# Patient Record
Sex: FEMALE | Race: WHITE | NOT HISPANIC OR LATINO | Employment: FULL TIME | URBAN - METROPOLITAN AREA
[De-identification: names, ages, dates, MRNs, and addresses within clinical notes are randomized per-mention and may not be internally consistent; named-entity substitution may affect disease eponyms.]

---

## 2023-08-23 DIAGNOSIS — F41.9 ANXIETY DISORDER, UNSPECIFIED: ICD-10-CM

## 2023-08-23 DIAGNOSIS — F32.A DEPRESSION, UNSPECIFIED: ICD-10-CM

## 2023-08-23 RX ORDER — SERTRALINE HYDROCHLORIDE 50 MG/1
50 TABLET, FILM COATED ORAL DAILY
Qty: 30 TABLET | Refills: 0 | Status: SHIPPED | OUTPATIENT
Start: 2023-08-23 | End: 2023-09-25

## 2023-08-30 DIAGNOSIS — F32.A DEPRESSION, UNSPECIFIED: ICD-10-CM

## 2023-08-30 DIAGNOSIS — F41.9 ANXIETY DISORDER, UNSPECIFIED: ICD-10-CM

## 2023-08-30 NOTE — TELEPHONE ENCOUNTER
Pt requests Zoloft 50 mg to be sent to [facility]. Pt is out of medication. Thank you.

## 2023-08-31 PROBLEM — G47.30 SLEEP DISORDER BREATHING: Status: ACTIVE | Noted: 2023-08-31

## 2023-08-31 PROBLEM — Z97.3 WEARS EYEGLASSES: Status: ACTIVE | Noted: 2023-08-31

## 2023-08-31 PROBLEM — H53.009 AMBLYOPIA: Status: ACTIVE | Noted: 2023-08-31

## 2023-08-31 PROBLEM — H18.529 ABMD (ANTERIOR BASEMENT MEMBRANE DYSTROPHY): Status: ACTIVE | Noted: 2023-08-31

## 2023-08-31 PROBLEM — H16.9 KERATITIS: Status: ACTIVE | Noted: 2023-08-31

## 2023-08-31 PROBLEM — H52.203 ASTIGMATISM OF BOTH EYES: Status: ACTIVE | Noted: 2023-08-31

## 2023-08-31 PROBLEM — R00.2 HEART PALPITATIONS: Status: ACTIVE | Noted: 2023-08-31

## 2023-08-31 PROBLEM — M94.0 COSTOCHONDRITIS: Status: ACTIVE | Noted: 2023-08-31

## 2023-08-31 PROBLEM — R06.83 SNORING: Status: ACTIVE | Noted: 2023-08-31

## 2023-08-31 PROBLEM — R41.3 MEMORY DIFFICULTIES: Status: ACTIVE | Noted: 2023-08-31

## 2023-08-31 PROBLEM — G47.33 OSA (OBSTRUCTIVE SLEEP APNEA): Status: ACTIVE | Noted: 2023-08-31

## 2023-08-31 PROBLEM — D22.5 MELANOCYTIC NEVI OF TRUNK: Status: ACTIVE | Noted: 2019-07-01

## 2023-08-31 PROBLEM — F41.9 ANXIETY AND DEPRESSION: Status: ACTIVE | Noted: 2023-08-31

## 2023-08-31 PROBLEM — H52.13 MYOPIA OF BOTH EYES: Status: ACTIVE | Noted: 2023-08-31

## 2023-08-31 PROBLEM — L68.0 HIRSUTISM: Status: ACTIVE | Noted: 2019-07-01

## 2023-08-31 PROBLEM — H18.839: Status: ACTIVE | Noted: 2023-08-31

## 2023-08-31 PROBLEM — F32.A ANXIETY AND DEPRESSION: Status: ACTIVE | Noted: 2023-08-31

## 2023-08-31 PROBLEM — H40.009 GLAUCOMA SUSPECT: Status: ACTIVE | Noted: 2023-08-31

## 2023-08-31 RX ORDER — LEVONORGESTREL 52 MG/1
INTRAUTERINE DEVICE INTRAUTERINE
COMMUNITY
Start: 2021-10-18

## 2023-08-31 RX ORDER — SERTRALINE HYDROCHLORIDE 50 MG/1
50 TABLET, FILM COATED ORAL DAILY
Qty: 30 TABLET | Refills: 0 | OUTPATIENT
Start: 2023-08-31

## 2023-08-31 RX ORDER — FLUTICASONE PROPIONATE 50 MCG
2 SPRAY, SUSPENSION (ML) NASAL DAILY
COMMUNITY
Start: 2023-08-09

## 2023-09-11 ENCOUNTER — APPOINTMENT (OUTPATIENT)
Dept: PRIMARY CARE | Facility: CLINIC | Age: 48
End: 2023-09-11
Payer: COMMERCIAL

## 2023-09-21 ENCOUNTER — APPOINTMENT (OUTPATIENT)
Dept: PRIMARY CARE | Facility: CLINIC | Age: 48
End: 2023-09-21
Payer: COMMERCIAL

## 2023-09-22 ENCOUNTER — TELEPHONE (OUTPATIENT)
Dept: PRIMARY CARE | Facility: CLINIC | Age: 48
End: 2023-09-22
Payer: COMMERCIAL

## 2023-09-22 DIAGNOSIS — B37.9 YEAST INFECTION: Primary | ICD-10-CM

## 2023-09-23 DIAGNOSIS — F32.A DEPRESSION, UNSPECIFIED: ICD-10-CM

## 2023-09-23 DIAGNOSIS — F41.9 ANXIETY DISORDER, UNSPECIFIED: ICD-10-CM

## 2023-09-25 DIAGNOSIS — F41.9 ANXIETY DISORDER, UNSPECIFIED: ICD-10-CM

## 2023-09-25 DIAGNOSIS — F32.A DEPRESSION, UNSPECIFIED: ICD-10-CM

## 2023-09-25 RX ORDER — SERTRALINE HYDROCHLORIDE 50 MG/1
50 TABLET, FILM COATED ORAL DAILY
Qty: 30 TABLET | Refills: 0 | Status: SHIPPED | OUTPATIENT
Start: 2023-09-25 | End: 2023-09-25

## 2023-09-25 RX ORDER — NYSTATIN 100000 U/G
CREAM TOPICAL
COMMUNITY
End: 2023-09-25 | Stop reason: SDUPTHER

## 2023-09-25 RX ORDER — SERTRALINE HYDROCHLORIDE 50 MG/1
TABLET, FILM COATED ORAL
Qty: 90 TABLET | Refills: 0 | Status: SHIPPED | OUTPATIENT
Start: 2023-09-25 | End: 2023-11-15 | Stop reason: SDUPTHER

## 2023-09-25 RX ORDER — NYSTATIN 100000 U/G
CREAM TOPICAL 2 TIMES DAILY
Qty: 30 G | Refills: 0 | Status: SHIPPED | OUTPATIENT
Start: 2023-09-25 | End: 2024-03-07

## 2023-09-25 NOTE — TELEPHONE ENCOUNTER
Patient last seen 7/2022. Appt 11/2023. Looks like zoloft was already sent in. Looks like the last time the nystatin cream was sent in in the old system was 10/2021. Send in Rx?

## 2023-10-04 ENCOUNTER — APPOINTMENT (OUTPATIENT)
Dept: PRIMARY CARE | Facility: CLINIC | Age: 48
End: 2023-10-04
Payer: COMMERCIAL

## 2023-10-09 ENCOUNTER — APPOINTMENT (OUTPATIENT)
Dept: OPHTHALMOLOGY | Facility: CLINIC | Age: 48
End: 2023-10-09
Payer: COMMERCIAL

## 2023-11-13 ENCOUNTER — APPOINTMENT (OUTPATIENT)
Dept: DERMATOLOGY | Facility: HOSPITAL | Age: 48
End: 2023-11-13
Payer: COMMERCIAL

## 2023-11-15 ENCOUNTER — OFFICE VISIT (OUTPATIENT)
Dept: PRIMARY CARE | Facility: CLINIC | Age: 48
End: 2023-11-15
Payer: COMMERCIAL

## 2023-11-15 ENCOUNTER — LAB (OUTPATIENT)
Dept: LAB | Facility: LAB | Age: 48
End: 2023-11-15
Payer: COMMERCIAL

## 2023-11-15 VITALS
HEART RATE: 60 BPM | RESPIRATION RATE: 16 BRPM | DIASTOLIC BLOOD PRESSURE: 80 MMHG | OXYGEN SATURATION: 98 % | WEIGHT: 173 LBS | TEMPERATURE: 97.8 F | BODY MASS INDEX: 30.65 KG/M2 | HEIGHT: 63 IN | SYSTOLIC BLOOD PRESSURE: 122 MMHG

## 2023-11-15 DIAGNOSIS — F41.9 ANXIETY DISORDER, UNSPECIFIED: ICD-10-CM

## 2023-11-15 DIAGNOSIS — Z12.31 ENCOUNTER FOR SCREENING MAMMOGRAM FOR BREAST CANCER: ICD-10-CM

## 2023-11-15 DIAGNOSIS — Z00.00 HEALTHCARE MAINTENANCE: Primary | ICD-10-CM

## 2023-11-15 DIAGNOSIS — Z12.4 SCREENING FOR CERVICAL CANCER: ICD-10-CM

## 2023-11-15 DIAGNOSIS — F32.A DEPRESSION, UNSPECIFIED: ICD-10-CM

## 2023-11-15 DIAGNOSIS — Z00.00 HEALTHCARE MAINTENANCE: ICD-10-CM

## 2023-11-15 LAB
ALBUMIN SERPL BCP-MCNC: 4.5 G/DL (ref 3.4–5)
ALP SERPL-CCNC: 72 U/L (ref 33–110)
ALT SERPL W P-5'-P-CCNC: 14 U/L (ref 7–45)
ANION GAP SERPL CALC-SCNC: 12 MMOL/L (ref 10–20)
AST SERPL W P-5'-P-CCNC: 15 U/L (ref 9–39)
BILIRUB SERPL-MCNC: 0.5 MG/DL (ref 0–1.2)
BUN SERPL-MCNC: 12 MG/DL (ref 6–23)
CALCIUM SERPL-MCNC: 9.6 MG/DL (ref 8.6–10.3)
CHLORIDE SERPL-SCNC: 103 MMOL/L (ref 98–107)
CHOLEST SERPL-MCNC: 227 MG/DL (ref 0–199)
CHOLESTEROL/HDL RATIO: 5
CO2 SERPL-SCNC: 28 MMOL/L (ref 21–32)
CREAT SERPL-MCNC: 0.67 MG/DL (ref 0.5–1.05)
ERYTHROCYTE [DISTWIDTH] IN BLOOD BY AUTOMATED COUNT: 12.2 % (ref 11.5–14.5)
GFR SERPL CREATININE-BSD FRML MDRD: >90 ML/MIN/1.73M*2
GLUCOSE SERPL-MCNC: 86 MG/DL (ref 74–99)
HCT VFR BLD AUTO: 40.8 % (ref 36–46)
HDLC SERPL-MCNC: 45.3 MG/DL
HGB BLD-MCNC: 14 G/DL (ref 12–16)
LDLC SERPL CALC-MCNC: 127 MG/DL
MCH RBC QN AUTO: 31.2 PG (ref 26–34)
MCHC RBC AUTO-ENTMCNC: 34.3 G/DL (ref 32–36)
MCV RBC AUTO: 91 FL (ref 80–100)
NON HDL CHOLESTEROL: 182 MG/DL (ref 0–149)
NRBC BLD-RTO: 0 /100 WBCS (ref 0–0)
PLATELET # BLD AUTO: 225 X10*3/UL (ref 150–450)
POC APPEARANCE, URINE: CLEAR
POC BILIRUBIN, URINE: NEGATIVE
POC BLOOD, URINE: NEGATIVE
POC COLOR, URINE: YELLOW
POC GLUCOSE, URINE: NEGATIVE MG/DL
POC KETONES, URINE: NEGATIVE MG/DL
POC LEUKOCYTES, URINE: NEGATIVE
POC NITRITE,URINE: NEGATIVE
POC PH, URINE: 6 PH
POC PROTEIN, URINE: NEGATIVE MG/DL
POC SPECIFIC GRAVITY, URINE: 1.01
POC UROBILINOGEN, URINE: 0.2 EU/DL
POTASSIUM SERPL-SCNC: 4 MMOL/L (ref 3.5–5.3)
PROT SERPL-MCNC: 7.5 G/DL (ref 6.4–8.2)
RBC # BLD AUTO: 4.49 X10*6/UL (ref 4–5.2)
SODIUM SERPL-SCNC: 139 MMOL/L (ref 136–145)
TRIGL SERPL-MCNC: 276 MG/DL (ref 0–149)
VLDL: 55 MG/DL (ref 0–40)
WBC # BLD AUTO: 5.7 X10*3/UL (ref 4.4–11.3)

## 2023-11-15 PROCEDURE — 81002 URINALYSIS NONAUTO W/O SCOPE: CPT | Performed by: FAMILY MEDICINE

## 2023-11-15 PROCEDURE — 80053 COMPREHEN METABOLIC PANEL: CPT

## 2023-11-15 PROCEDURE — 1036F TOBACCO NON-USER: CPT | Performed by: FAMILY MEDICINE

## 2023-11-15 PROCEDURE — 85027 COMPLETE CBC AUTOMATED: CPT

## 2023-11-15 PROCEDURE — 80061 LIPID PANEL: CPT

## 2023-11-15 PROCEDURE — 36415 COLL VENOUS BLD VENIPUNCTURE: CPT

## 2023-11-15 PROCEDURE — 87624 HPV HI-RISK TYP POOLED RSLT: CPT

## 2023-11-15 PROCEDURE — 88175 CYTOPATH C/V AUTO FLUID REDO: CPT

## 2023-11-15 PROCEDURE — 99396 PREV VISIT EST AGE 40-64: CPT | Performed by: FAMILY MEDICINE

## 2023-11-15 PROCEDURE — 93000 ELECTROCARDIOGRAM COMPLETE: CPT | Performed by: FAMILY MEDICINE

## 2023-11-15 RX ORDER — SERTRALINE HYDROCHLORIDE 50 MG/1
75 TABLET, FILM COATED ORAL DAILY
Qty: 135 TABLET | Refills: 3 | Status: SHIPPED | OUTPATIENT
Start: 2023-11-15 | End: 2024-11-14

## 2023-11-15 ASSESSMENT — ENCOUNTER SYMPTOMS
SHORTNESS OF BREATH: 0
DIARRHEA: 0
DYSURIA: 0
WHEEZING: 0
SINUS PRESSURE: 0
PALPITATIONS: 0
EYE ITCHING: 0
HEADACHES: 0
COUGH: 0
TROUBLE SWALLOWING: 0
EYE REDNESS: 0
NAUSEA: 0
POLYDIPSIA: 0
DIZZINESS: 0
FREQUENCY: 0
VOMITING: 0
FEVER: 0
NUMBNESS: 0
SORE THROAT: 0
UNEXPECTED WEIGHT CHANGE: 0
DIFFICULTY URINATING: 0
SINUS PAIN: 0
FATIGUE: 0
MYALGIAS: 0
EYE PAIN: 0

## 2023-11-15 ASSESSMENT — PATIENT HEALTH QUESTIONNAIRE - PHQ9
SUM OF ALL RESPONSES TO PHQ9 QUESTIONS 1 AND 2: 0
1. LITTLE INTEREST OR PLEASURE IN DOING THINGS: NOT AT ALL
2. FEELING DOWN, DEPRESSED OR HOPELESS: NOT AT ALL

## 2023-11-15 ASSESSMENT — COLUMBIA-SUICIDE SEVERITY RATING SCALE - C-SSRS
6. HAVE YOU EVER DONE ANYTHING, STARTED TO DO ANYTHING, OR PREPARED TO DO ANYTHING TO END YOUR LIFE?: NO
1. IN THE PAST MONTH, HAVE YOU WISHED YOU WERE DEAD OR WISHED YOU COULD GO TO SLEEP AND NOT WAKE UP?: NO
2. HAVE YOU ACTUALLY HAD ANY THOUGHTS OF KILLING YOURSELF?: NO

## 2023-11-15 NOTE — PROGRESS NOTES
"Subjective   Patient ID: Ivette Molina is a 48 y.o. female who presents for Annual Exam.    HPI     Fasting: overnight  Flu shot: taken    Mammogram: due   Pap: due     Anxiety: feels anxiety is higher, some life events have being affecting her. She noticing more difficulty with concentrating at work. Might want another referral to psychiatry.     Bursitis: in right shoulder, improved with therapy, but feels it is worsening now     Lower left back: lump, might be fatty or cystic         Review of Systems   Constitutional:  Negative for fatigue, fever and unexpected weight change.   HENT:  Negative for congestion, hearing loss, nosebleeds, sinus pressure, sinus pain, sore throat and trouble swallowing.    Eyes:  Negative for pain, redness and itching.   Respiratory:  Negative for cough, shortness of breath and wheezing.    Cardiovascular:  Negative for chest pain and palpitations.   Gastrointestinal:  Negative for diarrhea, nausea and vomiting.   Endocrine: Negative for cold intolerance, heat intolerance, polydipsia and polyuria.   Genitourinary:  Negative for difficulty urinating, dysuria and frequency.   Musculoskeletal:  Negative for myalgias.   Neurological:  Negative for dizziness, numbness and headaches.   Psychiatric/Behavioral:  Negative for self-injury and suicidal ideas.        Objective   /80 (BP Location: Left arm, Patient Position: Sitting)   Pulse 60   Temp 36.6 °C (97.8 °F)   Resp 16   Ht 1.6 m (5' 3\")   Wt 78.5 kg (173 lb)   SpO2 98%   BMI 30.65 kg/m²     Physical Exam  HENT:      Head: Normocephalic and atraumatic.      Right Ear: Tympanic membrane, ear canal and external ear normal.      Left Ear: Tympanic membrane, ear canal and external ear normal.      Nose: Nose normal.      Mouth/Throat:      Pharynx: Oropharynx is clear.   Eyes:      Extraocular Movements: Extraocular movements intact.      Conjunctiva/sclera: Conjunctivae normal.      Pupils: Pupils are equal, round, and " reactive to light.   Cardiovascular:      Rate and Rhythm: Normal rate and regular rhythm.   Pulmonary:      Breath sounds: Normal breath sounds.   Abdominal:      General: Bowel sounds are normal.      Palpations: Abdomen is soft.   Genitourinary:     General: Normal vulva.      Vagina: Normal.      Cervix: Normal.      Uterus: Normal.       Adnexa: Right adnexa normal and left adnexa normal.   Musculoskeletal:         General: Tenderness present.   Skin:     General: Skin is warm and dry.   Neurological:      Mental Status: She is alert.   Psychiatric:         Mood and Affect: Mood normal.         Behavior: Behavior normal.         Assessment/Plan   Problem List Items Addressed This Visit    None  Visit Diagnoses         Codes    Healthcare maintenance    -  Primary Z00.00    Relevant Orders    ECG 12 lead (Clinic Performed) (Completed)    POCT UA (nonautomated) manually resulted (Completed)    CBC    Comprehensive Metabolic Panel    Lipid Panel    Encounter for screening mammogram for breast cancer     Z12.31    Relevant Orders    BI mammo bilateral screening tomosynthesis    Anxiety disorder, unspecified     F41.9    Relevant Medications    sertraline (Zoloft) 50 mg tablet    Other Relevant Orders    Referral to Psychiatry    Depression, unspecified     F32.A    Relevant Medications    sertraline (Zoloft) 50 mg tablet    Screening for cervical cancer     Z12.4    Relevant Orders    THINPREP PAP TEST

## 2023-12-08 LAB
CYTOLOGY CMNT CVX/VAG CYTO-IMP: NORMAL
HPV HR 12 DNA GENITAL QL NAA+PROBE: NEGATIVE
HPV HR GENOTYPES PNL CVX NAA+PROBE: NEGATIVE
HPV16 DNA SPEC QL NAA+PROBE: NEGATIVE
HPV18 DNA SPEC QL NAA+PROBE: NEGATIVE
LAB AP HPV GENOTYPE QUESTION: YES
LAB AP HPV HR: NORMAL
LABORATORY COMMENT REPORT: NORMAL
PATH REPORT.TOTAL CANCER: NORMAL

## 2024-01-12 ENCOUNTER — HOSPITAL ENCOUNTER (OUTPATIENT)
Dept: RADIOLOGY | Facility: HOSPITAL | Age: 49
Discharge: HOME | End: 2024-01-12
Payer: COMMERCIAL

## 2024-01-12 DIAGNOSIS — Z12.31 ENCOUNTER FOR SCREENING MAMMOGRAM FOR BREAST CANCER: ICD-10-CM

## 2024-01-12 PROCEDURE — 77063 BREAST TOMOSYNTHESIS BI: CPT | Performed by: RADIOLOGY

## 2024-01-12 PROCEDURE — 77067 SCR MAMMO BI INCL CAD: CPT | Performed by: RADIOLOGY

## 2024-01-12 PROCEDURE — 77067 SCR MAMMO BI INCL CAD: CPT

## 2024-03-06 DIAGNOSIS — B37.9 YEAST INFECTION: ICD-10-CM

## 2024-03-07 RX ORDER — NYSTATIN 100000 U/G
CREAM TOPICAL
Qty: 30 G | Refills: 0 | Status: SHIPPED | OUTPATIENT
Start: 2024-03-07

## 2024-03-12 ENCOUNTER — EVALUATION (OUTPATIENT)
Dept: PHYSICAL THERAPY | Facility: CLINIC | Age: 49
End: 2024-03-12
Payer: COMMERCIAL

## 2024-03-12 DIAGNOSIS — M25.511 CHRONIC RIGHT SHOULDER PAIN: Primary | ICD-10-CM

## 2024-03-12 DIAGNOSIS — M75.51 BURSITIS OF RIGHT SHOULDER: ICD-10-CM

## 2024-03-12 DIAGNOSIS — G89.29 CHRONIC RIGHT SHOULDER PAIN: Primary | ICD-10-CM

## 2024-03-12 DIAGNOSIS — M25.611 DECREASED RANGE OF MOTION OF RIGHT SHOULDER: ICD-10-CM

## 2024-03-12 PROCEDURE — 97161 PT EVAL LOW COMPLEX 20 MIN: CPT | Mod: GP

## 2024-03-12 PROCEDURE — 97110 THERAPEUTIC EXERCISES: CPT | Mod: GP

## 2024-03-12 NOTE — PROGRESS NOTES
Patient Name: Ivette Molina  MRN: [id number]  Today's Date: 3/12/2024  Visit #1       Subjective:  Chief Complaint: Ivette is a 49 y.o. F who presents to therapy c/o R shoulder pain that travels down her lateral arm to her forearm. Symptoms began 1 year ago after lifting luggage OH - which started out as anterior shoulder pain. She followed this up with 6-7 visits of physical therapy until symptoms improved. This time - her symptoms appear to be more dispersed and limiting. She experiences pain at night and has noticed progressive mobility deficits. Denies any N/T, unexplained weight loss, HA, cervical pain, vertigo, nausea, nystagmus. No history of diabetes or thyroid disorders. Family history of adhesive capsulitis (mother).  Pain intensity at rest: 3/10  Pain intensity at worst: 6/10  Alleviating factors: Avoiding painful movements.   Aggravating factors: Sleeping/night, reaching OH and out to side  PMH: R SA impingement   Occupation: Clinical Researcher. Enjoys participating in group exercise classes with her friends.   Therapy Goals: Reduce pain and increase mobility     Objective:  Cervical AROM: No reproduction w/ OP  Flexion: No loss  Extension: No loss  Rotation L/R: No loss/No loss  Side Bending L/R: No loss/No loss     Shoulder PROM (L/R)  Flexion: 170°/110°  Abduction: 170°/95°  Extension: 60°/30°  External Rotation: T2 / Occiput  Internal rotation: T10 / Iliac crest    Shoulder PROM (L/R)  Flexion: 170°/110° - Shoulder hiking  Abduction: 170°/95° - shoulder hiking  Extension: 60°/30°  External Rotation: 90°/5°  Internal rotation: 70°/°10    Shoulder MMT (L/R)  Upper trapezius: 5/5, 5/5  Supraspinatus: 5/5, 4/5  Infraspinatus: 5/5, 4/5  Teres Minor: 5/5, 4/5 (adjusted position)  Subscapularis: 5/5, 4/5    Joint mobility testing (normal unless otherwise noted below)  GHJ: Moderate-severe hypomobility in all directions on R    Shoulder Special Tests (L/R)    Painful Arc: Unable to test on  R  Infraspinatus MMT: Negative/Negative  Drop Arm Test: Negative/Negative  Lateral Betzy: Negative/Positive  Shrug Sign: Negative/Positive   Lift-Off: Unable to test  Sher-Fredrick: Negative/ Positive  Passive Compression Test: Negative/Negative  Passive Distraction Test: Negative/Negative  Capsular pattern: Negative/Positive    QuickDASH: 27.27%    Treatment:  Therapeutic Exercise: HEP provided  *Pendulum swings -> weighted  *Table slides: Flex and ABD variations  *Sleeper stretch at wall  *Supine ER stretch in supine w/ wand    Education: SH anatomy, POC, intensity of exercises/avoiding pushing through pain, progression of exercises, daily exercise participation. Answered all questions in full.    Assessment:   Ivette presents to therapy w/ c/o R global shoulder pain that occasionally refers to her elbow and forearm. Symptoms do not appear to be radicular in nature - nor do they represent any SAS pathology. At this point - symptoms appear to be consistent w/ possible adhesive capsulitis. She is likely to benefit from skilled interventions to address their impairments, and treatment that includes: manual techniques to decrease pain and improve joint/soft-tissue mobility, as well as exercises to increase strength, endurance, and neuromotor control.    Standardized testing and measures administered today reveal that the patient has multiple impairments in body structures and functions, activity limitations, and participation restrictions.  The patient has 0 personal factors and comorbidities that may serve as barriers affecting the plan of care. The patient's clinical presentation includes stable & uncomplicated characteristics as noted during today's evaluation, & these findings indicate that this patient is of low complexity.  Skilled PT services are warranted in order to realize measurable change in the above outcome measures and achieve improvements in the patient's functional status and individual  goals.    Plan:   Planned interventions include: dry needling, education/instruction, manual therapy, neuromuscular re-education, self care/home management, therapeutic activities and therapeutic exercises.   Potential to achieve rehab goals: good  Goals:   Demonstrate independence with home exercise program  Tolerate increased exercise without adverse reaction  Demonstrate improved posture & proper body mechanics throughout session  Increase R SH ROM to pain free + WNL  Increase R SH strength to pain free + WNL  Report decrease in pain by > 2 points to meet the MCID  Decrease score of Quick DASH by > 8 points to meet the MCID  Perform ADLs without an increase symptoms  Pt. will be able to sleep through the night without an increase in symptoms  Achieve pt. specific goals including: Return to previous level of activity - uninhibited by pain

## 2024-03-18 ENCOUNTER — OFFICE VISIT (OUTPATIENT)
Dept: DERMATOLOGY | Facility: HOSPITAL | Age: 49
End: 2024-03-18
Payer: COMMERCIAL

## 2024-03-18 DIAGNOSIS — L81.4 LENTIGO: ICD-10-CM

## 2024-03-18 DIAGNOSIS — D22.9 MULTIPLE BENIGN NEVI: Primary | ICD-10-CM

## 2024-03-18 DIAGNOSIS — L82.1 SEBORRHEIC KERATOSIS: ICD-10-CM

## 2024-03-18 DIAGNOSIS — D18.01 HEMANGIOMA OF SKIN: ICD-10-CM

## 2024-03-18 DIAGNOSIS — Z12.83 SCREENING EXAM FOR SKIN CANCER: ICD-10-CM

## 2024-03-18 DIAGNOSIS — L57.8 ACTINIC SKIN DAMAGE: ICD-10-CM

## 2024-03-18 PROCEDURE — 1036F TOBACCO NON-USER: CPT | Performed by: DERMATOLOGY

## 2024-03-18 PROCEDURE — 99203 OFFICE O/P NEW LOW 30 MIN: CPT | Performed by: DERMATOLOGY

## 2024-03-18 PROCEDURE — 99213 OFFICE O/P EST LOW 20 MIN: CPT | Mod: GC | Performed by: DERMATOLOGY

## 2024-03-18 ASSESSMENT — DERMATOLOGY PATIENT ASSESSMENT
DO YOU HAVE IRREGULAR MENSTRUAL CYCLES: NO
WHAT TYPE OF BIRTH CONTROL: IUD
DO YOU USE SUNSCREEN: DAILY
HAVE YOU HAD OR DO YOU HAVE VASCULAR DISEASE: NO
DO YOU HAVE ANY NEW OR CHANGING LESIONS: NO
ARE YOU AN ORGAN TRANSPLANT RECIPIENT: NO
HAVE YOU HAD OR DO YOU HAVE A STAPH INFECTION: NO
ARE YOU TRYING TO GET PREGNANT: NO
ARE YOU ON BIRTH CONTROL: YES

## 2024-03-18 ASSESSMENT — DERMATOLOGY QUALITY OF LIFE (QOL) ASSESSMENT
RATE HOW BOTHERED YOU ARE BY SYMPTOMS OF YOUR SKIN PROBLEM (EG, ITCHING, STINGING BURNING, HURTING OR SKIN IRRITATION): 0 - NEVER BOTHERED
DATE THE QUALITY-OF-LIFE ASSESSMENT WAS COMPLETED: 66917
ARE THERE EXCLUSIONS OR EXCEPTIONS FOR THE QUALITY OF LIFE ASSESSMENT: NO
RATE HOW BOTHERED YOU ARE BY EFFECTS OF YOUR SKIN PROBLEMS ON YOUR ACTIVITIES (EG, GOING OUT, ACCOMPLISHING WHAT YOU WANT, WORK ACTIVITIES OR YOUR RELATIONSHIPS WITH OTHERS): 0 - NEVER BOTHERED
RATE HOW EMOTIONALLY BOTHERED YOU ARE BY YOUR SKIN PROBLEM (FOR EXAMPLE, WORRY, EMBARRASSMENT, FRUSTRATION): 0 - NEVER BOTHERED

## 2024-03-18 ASSESSMENT — PATIENT GLOBAL ASSESSMENT (PGA): PATIENT GLOBAL ASSESSMENT: PATIENT GLOBAL ASSESSMENT:  1 - CLEAR

## 2024-03-18 ASSESSMENT — ITCH NUMERIC RATING SCALE: HOW SEVERE IS YOUR ITCHING?: 0

## 2024-03-18 NOTE — PROGRESS NOTES
Subjective     Ivette Molina is a 49 y.o. female who presents for the following: Skin Check. No lesions of concern today. Denies family history of melanoma, but sister had an SCC removed.    Review of Systems:  No other skin or systemic complaints other than what is documented elsewhere in the note.    The following portions of the chart were reviewed this encounter and updated as appropriate:         Skin Cancer History  No skin cancer on file.      Specialty Problems          Dermatology Problems    Hirsutism    Melanocytic nevi of trunk        Objective   Well appearing patient in no apparent distress; mood and affect are within normal limits.    A full examination was performed including scalp, head, eyes, ears, nose, lips, neck, chest, axillae, abdomen, back, buttocks, bilateral upper extremities, bilateral lower extremities, hands, feet, fingers, toes, fingernails, and toenails. All findings within normal limits unless otherwise noted below.    Assessment/Plan   1. Multiple benign nevi  Brown and tan macules and papules with reassuring findings on dermoscopy    -These lesions have benign, reassuring patterns on dermoscopy  -Recommend continued self observation, and to contact the office if any changes in nevi are noticed    2. Lentigo  Tan macules    -Benign appearing on exam  -Reassurance, recommend observation    3. Seborrheic keratosis  Stuck on, waxy macule(s)/papule(s)/plaque(s) with comedo-like openings and milia like cysts    -Discussed the nature of the diagnosis  -Reassurance, recommend continued observation    4. Hemangioma of skin  Cherry red papules    -Discussed the nature of the diagnosis  -Reassurance, recommend continued observation    5. Actinic skin damage  Background of photodamage with hyper- and hypo-pigmented macules on the skin    6. Screening exam for skin cancer    Full body skin exam  -No lesions concerning for malignancy on the remainder the skin exam today   - The ugly duckling  sign was discussed. Monitor for any skin lesions that are different in color, shape, or size than others on body  -Sun protection was discussed. Recommend SPF 30+, hats with brims, sun protective clothing, and avoiding sun exposure between 10 AM and 2 PM whenever possible  -Recommend regular skin exams or sooner if new or changing lesions       Abdoul Gooden MD  PGY-4 Dermatology    RTC in 2 years for Full Skin Exam     I saw and evaluated the patient. I personally obtained the key and critical portions of the history and physical exam or was physically present for key and critical portions performed by the resident/fellow. I reviewed the resident/fellow's documentation and discussed the patient with the resident/fellow. I agree with the resident/fellow's medical decision making as documented in the note and made changes where appropriate.     Karly Nieves MD

## 2024-03-18 NOTE — PATIENT INSTRUCTIONS

## 2024-03-19 ENCOUNTER — APPOINTMENT (OUTPATIENT)
Dept: PHYSICAL THERAPY | Facility: CLINIC | Age: 49
End: 2024-03-19
Payer: COMMERCIAL

## 2024-03-26 ENCOUNTER — APPOINTMENT (OUTPATIENT)
Dept: PHYSICAL THERAPY | Facility: CLINIC | Age: 49
End: 2024-03-26
Payer: COMMERCIAL

## 2024-03-29 ENCOUNTER — APPOINTMENT (OUTPATIENT)
Dept: PHYSICAL THERAPY | Facility: CLINIC | Age: 49
End: 2024-03-29
Payer: COMMERCIAL

## 2024-04-02 ENCOUNTER — APPOINTMENT (OUTPATIENT)
Dept: PHYSICAL THERAPY | Facility: CLINIC | Age: 49
End: 2024-04-02
Payer: COMMERCIAL

## 2024-04-05 ENCOUNTER — APPOINTMENT (OUTPATIENT)
Dept: PHYSICAL THERAPY | Facility: CLINIC | Age: 49
End: 2024-04-05
Payer: COMMERCIAL

## 2024-06-06 ENCOUNTER — OFFICE VISIT (OUTPATIENT)
Dept: SLEEP MEDICINE | Facility: CLINIC | Age: 49
End: 2024-06-06
Payer: COMMERCIAL

## 2024-06-06 VITALS
BODY MASS INDEX: 31.01 KG/M2 | HEART RATE: 76 BPM | WEIGHT: 175 LBS | SYSTOLIC BLOOD PRESSURE: 132 MMHG | OXYGEN SATURATION: 96 % | HEIGHT: 63 IN | RESPIRATION RATE: 18 BRPM | DIASTOLIC BLOOD PRESSURE: 88 MMHG

## 2024-06-06 DIAGNOSIS — G47.30 SLEEP DISORDER BREATHING: Primary | ICD-10-CM

## 2024-06-06 PROCEDURE — 99213 OFFICE O/P EST LOW 20 MIN: CPT | Performed by: GENERAL PRACTICE

## 2024-06-06 NOTE — PATIENT INSTRUCTIONS
[facility] Sleep Medicine   [facility]  [address] [address]  [phone]       NAME: Ivette Molina   DATE: 6/6/2024     Your Sleep Provider Today: Beverly Greco,   Your Primary Care Physician: Mila Simmons DO   Your Referring Provider: No ref. provider found    DIAGNOSIS:   1. Sleep disorder breathing  In-Center Sleep Study (Sleep Provider Only)          Thank you for coming to the Sleep Medicine Clinic today! Your sleep medicine provider today was: Beverly Greco DO Below is a summary of your treatment plan, other important information, and our contact numbers:      TREATMENT PLAN   Follow up after sleep study or sooner as needed.     IMPORTANT INFORMATION     Call 911 for medical emergencies.  Our offices are generally open from Monday-Friday, 9 am - 5 pm.  If you need to get in touch with me, you may either call me and my team(number is below) or you can use Start[employer].  If a referral for a test, for CPAP, or for another specialist was made, and you have not heard about scheduling this within a week, please call scheduling at [phone]).  If you are unable to make your appointment for clinic or an overnight study, kindly call the office at least 48 hours in advance to cancel and reschedule.  If you are on CPAP, please bring your device's card or the device to each clinic appointment.   There are no supporting services by either the sleep doctors or their staff on weekends and Holidays, or after 5 PM on weekdays.   If you have been asked to come to a sleep study, make sure you bring toiletries, a comfy pillow, and any nighttime medications that you may regularly take. Also be sure to eat dinner before you arrive. We generally do not provide meals.      PRESCRIPTIONS     We require 7 days advanced notice for prescription refills. If we do not receive the request in this time, we cannot guarantee that your medication will be  refilled in time.      IMPORTANT PHONE NUMBERS     Sleep Medicine Clinic Fax: [phone]  Appointments (for Pediatric Sleep Clinic): [phone] - option 1  Appointments (for Adult Sleep Clinic): [phone] - option 2  Appointments (For Sleep Studies): [phone] - option 3  Behavioral Sleep Medicine: [phone]  Sleep Surgery: [phone]  ENT (Otolaryngology): [phone]  Headache Clinic (Neurology): [phone]  Neurology: [phone]  Psychiatry: [phone]  Pulmonary Function Testing (PFT) Center: [phone]  Pulmonary Medicine: [phone]  [employer] (DME): [phone]  [employer] (DME): [phone]  [facility] ([facility]): [phone]      OUR ADULT SLEEP MEDICINE TEAM   Please do not hesitate to call the office or sleep nurse with any questions between appointments:    Adult Sleep Nurses (Patito Boyd, RN and Liza Garcia RN):  For clinical questions and refilling prescriptions: [phone]  Email sleep diaries and other documents at: [email or web address]    Adult Sleep Medicine Secretaries:  Shilpi Marion (For [place]/Murray/Krise/Strohl/Yeh/Roberts):   P: [phone]  F: [phone]  Lissette Cruz (For Garcia/Guggenbiller): P: [phone]  Fax: [phone]  Shirley Regalado (For [place]/Blank): P: [phone]  F: [phone]  Meg Linder (For [place]): P: [phone]  F: [phone]  Noemí Meraz (For Silvana/Rashida/Zakhary): P: [phone]  F: [phone]  Dawn Torres (For [place]): P: [phone]  F: [phone]     Adult Sleep Medicine Advanced Practice Providers:  Javier Hoffman ([place], [place])  Taylor Field ([facility])  Lina Carter CNP ([place], [place], [facility])  Ana Laura Morgan CNP ([place], [place], [facility])  Alana Sosa ([facility] [place], [facility])  Fred Jackson CNP ([facility])        OUR SLEEP TESTING LOCATIONS     Our team will contact you to  "schedule your sleep study, however, you can contact us as follow:  Main Phone Line (scheduling only): [phone]), option 3  Adult and Pediatric Locations   [place] (6 years and older): [facility] - 4th floor ([address], [facility]) After hours line: [phone]  [facility] (Main campus: All ages): [facility], 6th floor. After hours line: [phone]   [place] (5 years and older; younger considered on case-by-case basis): [address]; [address], [address]. Scheduling  After hours line: [phone]   [place] (6 years and older): [address]; [address]; [address]   [place] (6 years and older): [address], [address]  After hours line: [phone]   [place] (13 years and older) in [place]: [address], 2nd floor  After hours line: [phone]   [place] (13 year and older): [address]  After hours line: [phone]     Adult Only Locations:   [place] (18 years and older): [address], 2nd floor   [place] (18 years and older): [address]; 4th floor  After hours line: [phone]   [place] (18 years and older) at [place]: [id number] [place] [address]  After hours line: [phone]          CONTACTING YOUR SLEEP MEDICINE PROVIDER     Send a message directly to your provider through \"My Chart\", which is the email service through your  Records Account: [email or web address]   Call [phone] and leave a message. One of the administrative assistants will forward the message to your sleep medicine provider through \"My Chart\" and/or email.     Your sleep medicine provider for this visit was: Beverly Greco,         "

## 2024-06-06 NOTE — PROGRESS NOTES
Patient: Ivette Molina    [id number]  : 1975 -- AGE 49 y.o.    Provider: Beverly Greco DO     [facility]   Service Date: 2024              [facility] Sleep Medicine Clinic  Follow up Visit Note        HISTORY OF PRESENT ILLNESS       HISTORY OF PRESENT ILLNESS   Ivette Molina is a 49 y.o. female who presents to a [facility] Sleep Medicine Clinic for a sleep medicine evaluation with concerns of No chief complaint on file..     The patient  has a past medical history of Myopia, unspecified eye (02/10/2016), Personal history of Methicillin resistant Staphylococcus aureus infection, and Unspecified astigmatism, unspecified eye (02/10/2016)..    PAST SLEEP HISTORY    F with pmhx including anxiety/ depression, has an IUD.     [from a previous visit:  Patient is having symptoms of snoring, witnessed apneas, morning HA, dry mouth. Since she was last seen she has been having 1 episode of waking on most nights and is awake for 1-2 hrs. She does not know why she wakes, so she goes on her phone until she feels sleepy again.      Home Sleep Study 21   3% AHI: 6.9  3% Supine AHI: 25  4% AHI 4.1  4% supine AHI: 14.5  O2 [place] ]      22, patient states that overall she is sleeping better especially now that she exercises more regularly, she still occasionally wakes up at night. she would like to discuss treatment options for her sleep apnea; she has not received pap machine yet.   CURRENT HISTORY    On today's visit, 2024, the patient reports that she still snores an feels sleepy during the day. She would like to be reevaluated for sleep apnea.     Sleep schedule  on weekdays / work days:  Usual Bedtime: 10pm  Sleep latency: few min  Wake time : 5am  Total sleep time average/day: 6-7 hours/day  Awakenings: 2x per night, unknown reason, short   Naps: 2x per week, after lunch, 15-20 min.       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     Preferred sleeping position: SLEEP POSITION: prone    Sleep-related ROS:    Snoring:  y  Witnessed apneas: n       Gasping/ choking: n    Am Dry mouth:  y           Nasal congestion: n        am headaches: y    Sleep is described as unrefreshing.     Daytime sleepiness: y  Fatigue or decreased energy: y  Difficulty remembering things in daytime: y  Difficulty staying focused in daytime: : y  Irritable during the day: n    Drowsy driving: n  Hx of car accident: n  Near-miss Car accident: n      RLS screen:  RLSSCREEN: - Sensations: Patient does not have unusual sensations in their extremities that cause an urge to move them     Sleep-related behaviors: DENIES      ESS: 16      REVIEW OF SYSTEMS     REVIEW OF SYSTEMS  Review of Systems   All other systems reviewed and are negative.      ALLERGIES AND MEDICATIONS     ALLERGIES  No Known Allergies    MEDICATIONS  Current Outpatient Medications   Medication Sig Dispense Refill    fluticasone (Flonase) 50 mcg/actuation nasal spray Administer 2 sprays into each nostril once daily.      levonorgestrel (Mirena) 21 mcg/24 hours (8 yrs) 52 mg IUD by intrauterine route.      nystatin (Mycostatin) cream APPLY A THIN LAYER TOPICALLY TO AFFECTED AREA(S) AND RUB IN WELL TWICE DAILY 30 g 0    sertraline (Zoloft) 50 mg tablet Take 1.5 tablets (75 mg) by mouth once daily. 135 tablet 3     No current facility-administered medications for this visit.         PAST HISTORY     PAST MEDICAL HISTORY  She  has a past medical history of Myopia, unspecified eye (02/10/2016), Personal history of Methicillin resistant Staphylococcus aureus infection, and Unspecified astigmatism, unspecified eye (02/10/2016).      PAST SURGICAL HISTORY:  Past Surgical History:   Procedure Laterality Date    OTHER SURGICAL  "HISTORY  12/09/2016    Surgical Equipment Laser Devices    STRABISMUS SURGERY  07/16/2014    Strabismus Surgery       FAMILY HISTORY  Family History   Problem Relation Name Age of Onset    Arthritis Father      Stroke Father      Hypertension Father      Asthma Sister      Cancer Sister      Asthma Brother      Neurofibromatosis Brother           SOCIAL HISTORY  She  reports that she has quit smoking. Her smoking use included cigarettes. She has never used smokeless tobacco. She reports current alcohol use. She reports that she does not use drugs.    Caffeine consumption: 2-3 cups coffee/ tea throughout the day.       PHYSICAL EXAM     VITAL SIGNS: /88   Pulse 76   Resp 18   Ht 1.6 m (5' 3\")   Wt 79.4 kg (175 lb)   SpO2 96%   BMI 31.00 kg/m²      PREVIOUS WEIGHTS:  Wt Readings from Last 3 Encounters:   06/06/24 79.4 kg (175 lb)   11/15/23 78.5 kg (173 lb)   08/09/23 75.3 kg (166 lb)       Physical Exam  Constitutional: Alert and oriented, cooperative, no obvious distress.   HENT: normocephalic.   Neurologic: AOx3.   psychiatric: appropriate mood and affect.  Modified Mallampati: 4      RESULTS/DATA           ASSESSMENT/PLAN     Ms. Molina is a 49 y.o. female and  has a past medical history of Myopia, unspecified eye (02/10/2016), Personal history of Methicillin resistant Staphylococcus aureus infection, and Unspecified astigmatism, unspecified eye (02/10/2016). She was referred to the [facility] for evaluation of sleep apnea.     Problem List Items Addressed This Visit       Sleep disorder breathing - Primary    Relevant Orders    In-Center Sleep Study (Sleep Provider Only)       Problem List and Orders    F with pmhx including anxiety/ depression, has an IUD, heart palpitations, memory difficulties, ELSA .      1- ELSA  HST 12/18/2021 --> mild ELSA based on AASM criteria, AHI 3% 6.9, AHI 4% 4.1, O2 camilla 84.3%. Consider AutoPap.    Reviewed and discussed the above sleep " study results and management options in details. All questions answered, patient verbalizes understanding.      -Patient  would like to be rested for sleep apnea and is considering getting treatment for sleep apnea if she still has yenny.     -ordered sleep study.   -symptoms as per HPI.     -do not drive or operate heavy machinery if drowsy.  -avoid sleeping on your back.   -avoid sedating substances/ medication, alcohol, illicit drugs and tobacco.     2- Obese  counseled on eating a healthy diet and exercising as tolerated.      Follow up after sleep study or sooner as needed.

## 2024-07-12 DIAGNOSIS — G47.30 SLEEP DISORDER BREATHING: Primary | ICD-10-CM

## 2024-07-17 ENCOUNTER — APPOINTMENT (OUTPATIENT)
Dept: SLEEP MEDICINE | Facility: CLINIC | Age: 49
End: 2024-07-17
Payer: COMMERCIAL

## 2024-08-08 ENCOUNTER — CLINICAL SUPPORT (OUTPATIENT)
Dept: SLEEP MEDICINE | Facility: HOSPITAL | Age: 49
End: 2024-08-08
Payer: COMMERCIAL

## 2024-08-08 ENCOUNTER — APPOINTMENT (OUTPATIENT)
Dept: SLEEP MEDICINE | Facility: HOSPITAL | Age: 49
End: 2024-08-08
Payer: COMMERCIAL

## 2024-08-08 DIAGNOSIS — G47.30 SLEEP DISORDER BREATHING: ICD-10-CM

## 2024-08-08 NOTE — PROGRESS NOTES
Type of Study: HOME SLEEP STUDY - NOMAD     The patient received equipment and instructions for use of the [facility] Nomad HSAT 4038 device. The patient was instructed how to apply the effort belts, cannula, thermistor. It was also explained how the Nomad and oximeter components work.  The patient was asked to record their sleep for an 8-hour period.     The patient was informed of their responsibility for the device and acknowledged this by signing the HSAT device contract. The patient was asked to return the device on 8/9/2024 between the hours of 9am to the [facility].     The patient was instructed to call 911 as usual for any medical- emergencies while at home.  The patient was also given a phone number for troubleshooting when using the device in case there were additional questions.

## 2024-10-21 ENCOUNTER — APPOINTMENT (OUTPATIENT)
Dept: SLEEP MEDICINE | Facility: CLINIC | Age: 49
End: 2024-10-21
Payer: COMMERCIAL

## 2024-11-08 ENCOUNTER — APPOINTMENT (OUTPATIENT)
Dept: OBSTETRICS AND GYNECOLOGY | Facility: CLINIC | Age: 49
End: 2024-11-08
Payer: COMMERCIAL

## 2024-11-18 ENCOUNTER — APPOINTMENT (OUTPATIENT)
Dept: PRIMARY CARE | Facility: CLINIC | Age: 49
End: 2024-11-18
Payer: COMMERCIAL

## 2024-11-21 ENCOUNTER — OFFICE VISIT (OUTPATIENT)
Dept: PRIMARY CARE | Facility: CLINIC | Age: 49
End: 2024-11-21
Payer: COMMERCIAL

## 2024-11-21 VITALS
HEART RATE: 76 BPM | HEIGHT: 63 IN | BODY MASS INDEX: 30.3 KG/M2 | SYSTOLIC BLOOD PRESSURE: 134 MMHG | DIASTOLIC BLOOD PRESSURE: 80 MMHG | OXYGEN SATURATION: 98 % | RESPIRATION RATE: 18 BRPM | TEMPERATURE: 98.2 F | WEIGHT: 171 LBS

## 2024-11-21 DIAGNOSIS — Z23 NEED FOR INFLUENZA VACCINATION: ICD-10-CM

## 2024-11-21 DIAGNOSIS — R21 RASH: ICD-10-CM

## 2024-11-21 DIAGNOSIS — Z00.00 HEALTHCARE MAINTENANCE: ICD-10-CM

## 2024-11-21 DIAGNOSIS — Z12.31 ENCOUNTER FOR SCREENING MAMMOGRAM FOR BREAST CANCER: Primary | ICD-10-CM

## 2024-11-21 DIAGNOSIS — F32.A ANXIETY AND DEPRESSION: ICD-10-CM

## 2024-11-21 DIAGNOSIS — M25.611 DECREASED RANGE OF MOTION OF RIGHT SHOULDER: ICD-10-CM

## 2024-11-21 DIAGNOSIS — F41.9 ANXIETY AND DEPRESSION: ICD-10-CM

## 2024-11-21 LAB
POC APPEARANCE, URINE: CLEAR
POC BILIRUBIN, URINE: NEGATIVE
POC BLOOD, URINE: NEGATIVE
POC COLOR, URINE: YELLOW
POC GLUCOSE, URINE: NEGATIVE MG/DL
POC KETONES, URINE: NEGATIVE MG/DL
POC LEUKOCYTES, URINE: NEGATIVE
POC NITRITE,URINE: NEGATIVE
POC PH, URINE: 6.5 PH
POC PROTEIN, URINE: NEGATIVE MG/DL
POC SPECIFIC GRAVITY, URINE: 1.01
POC UROBILINOGEN, URINE: 0.2 EU/DL

## 2024-11-21 PROCEDURE — 93000 ELECTROCARDIOGRAM COMPLETE: CPT | Performed by: FAMILY MEDICINE

## 2024-11-21 PROCEDURE — 99396 PREV VISIT EST AGE 40-64: CPT | Performed by: FAMILY MEDICINE

## 2024-11-21 PROCEDURE — 3008F BODY MASS INDEX DOCD: CPT | Performed by: FAMILY MEDICINE

## 2024-11-21 PROCEDURE — 81002 URINALYSIS NONAUTO W/O SCOPE: CPT | Performed by: FAMILY MEDICINE

## 2024-11-21 PROCEDURE — 90471 IMMUNIZATION ADMIN: CPT | Performed by: FAMILY MEDICINE

## 2024-11-21 PROCEDURE — 1036F TOBACCO NON-USER: CPT | Performed by: FAMILY MEDICINE

## 2024-11-21 PROCEDURE — 90656 IIV3 VACC NO PRSV 0.5 ML IM: CPT | Performed by: FAMILY MEDICINE

## 2024-11-21 RX ORDER — BUPROPION HYDROCHLORIDE 150 MG/1
150 TABLET ORAL EVERY MORNING
Qty: 30 TABLET | Refills: 1 | Status: SHIPPED | OUTPATIENT
Start: 2024-11-21 | End: 2025-01-20

## 2024-11-21 RX ORDER — CLOTRIMAZOLE AND BETAMETHASONE DIPROPIONATE 10; .64 MG/G; MG/G
1 CREAM TOPICAL 2 TIMES DAILY
Qty: 45 G | Refills: 0 | Status: SHIPPED | OUTPATIENT
Start: 2024-11-21

## 2024-11-21 RX ORDER — BUPROPION HYDROCHLORIDE 150 MG/1
150 TABLET ORAL EVERY MORNING
Qty: 30 TABLET | Refills: 1 | Status: SHIPPED | OUTPATIENT
Start: 2024-11-21 | End: 2024-11-21 | Stop reason: SDUPTHER

## 2024-11-21 ASSESSMENT — ENCOUNTER SYMPTOMS
DYSPHORIC MOOD: 1
ENDOCRINE NEGATIVE: 1
NEUROLOGICAL NEGATIVE: 1
RESPIRATORY NEGATIVE: 1
EYES NEGATIVE: 1
ARTHRALGIAS: 1
CONSTITUTIONAL NEGATIVE: 1
CARDIOVASCULAR NEGATIVE: 1
GASTROINTESTINAL NEGATIVE: 1

## 2024-11-21 ASSESSMENT — PATIENT HEALTH QUESTIONNAIRE - PHQ9
SUM OF ALL RESPONSES TO PHQ9 QUESTIONS 1 AND 2: 1
10. IF YOU CHECKED OFF ANY PROBLEMS, HOW DIFFICULT HAVE THESE PROBLEMS MADE IT FOR YOU TO DO YOUR WORK, TAKE CARE OF THINGS AT HOME, OR GET ALONG WITH OTHER PEOPLE: SOMEWHAT DIFFICULT
1. LITTLE INTEREST OR PLEASURE IN DOING THINGS: NOT AT ALL
2. FEELING DOWN, DEPRESSED OR HOPELESS: SEVERAL DAYS

## 2024-11-21 NOTE — PROGRESS NOTES
"Subjective     Patient ID: Ivette Molina is a 49 y.o. female who presents for Annual Exam.  HPI Bursitis in the right shoulder. Did PT and it got better. Then it came back and she went again to PT and her ROM is not as good.  Now it is starting in her left shoulder.     Review of Systems   Constitutional: Negative.    HENT: Negative.     Eyes: Negative.    Respiratory: Negative.     Cardiovascular: Negative.    Gastrointestinal: Negative.    Endocrine: Negative.    Genitourinary: Negative.    Musculoskeletal:  Positive for arthralgias.   Skin:  Positive for rash.   Neurological: Negative.    Psychiatric/Behavioral:  Positive for dysphoric mood.        Objective     Vitals:    11/21/24 1223   BP: 134/80   BP Location: Right arm   Patient Position: Sitting   Pulse: 76   Resp: 18   Temp: 36.8 °C (98.2 °F)   TempSrc: Temporal   SpO2: 98%   Weight: 77.6 kg (171 lb)   Height: 1.6 m (5' 3\")        Current Outpatient Medications   Medication Instructions    buPROPion XL (WELLBUTRIN XL) 150 mg, oral, Every morning, Do not crush, chew, or split.    clotrimazole-betamethasone (Lotrisone) cream 1 Application, Topical, 2 times daily    fluticasone (Flonase) 50 mcg/actuation nasal spray 2 sprays, Daily    levonorgestrel (Mirena) 21 mcg/24 hours (8 yrs) 52 mg IUD by intrauterine route.    nystatin (Mycostatin) cream APPLY A THIN LAYER TOPICALLY TO AFFECTED AREA(S) AND RUB IN WELL TWICE DAILY        Physical Exam  Constitutional:       General: She is not in acute distress.     Appearance: Normal appearance.   HENT:      Head: Normocephalic and atraumatic.      Right Ear: Tympanic membrane normal.      Left Ear: Tympanic membrane normal.      Nose: Nose normal.      Mouth/Throat:      Pharynx: Oropharynx is clear.   Eyes:      Conjunctiva/sclera: Conjunctivae normal.      Pupils: Pupils are equal, round, and reactive to light.   Neck:      Vascular: No carotid bruit.   Cardiovascular:      Rate and Rhythm: Normal rate and " regular rhythm.      Heart sounds: Normal heart sounds.   Pulmonary:      Effort: Pulmonary effort is normal.      Breath sounds: Normal breath sounds.   Abdominal:      General: Bowel sounds are normal.      Palpations: Abdomen is soft.   Musculoskeletal:      Cervical back: Neck supple. No tenderness.   Skin:     General: Skin is warm and dry.   Neurological:      General: No focal deficit present.      Mental Status: She is alert.   Psychiatric:         Mood and Affect: Mood normal.         Behavior: Behavior normal.         Assessment/Plan   Diagnoses and all orders for this visit:  Encounter for screening mammogram for breast cancer  -     BI mammo bilateral screening tomosynthesis; Future  Healthcare maintenance  -     POCT UA (nonautomated) manually resulted  -     ECG 12 lead (Clinic Performed)  -     CBC; Future  -     Comprehensive Metabolic Panel; Future  -     Lipid Panel; Future  Decreased range of motion of right shoulder  -     Referral to Sports Medicine; Future  Need for influenza vaccination  -     Flu vaccine, trivalent, preservative free, age 6 months and greater (Fluraix/Fluzone/Flulaval)  Anxiety and depression  -     buPROPion XL (Wellbutrin XL) 150 mg 24 hr tablet; Take 1 tablet (150 mg) by mouth once daily in the morning. Do not crush, chew, or split.  Rash  -     clotrimazole-betamethasone (Lotrisone) cream; Apply 1 Application topically 2 times a day.             Will start Lexapro or Wellbutrin if she would like.

## 2024-12-05 ENCOUNTER — APPOINTMENT (OUTPATIENT)
Dept: OBSTETRICS AND GYNECOLOGY | Facility: CLINIC | Age: 49
End: 2024-12-05
Payer: COMMERCIAL

## 2024-12-05 VITALS
DIASTOLIC BLOOD PRESSURE: 82 MMHG | HEIGHT: 63 IN | BODY MASS INDEX: 31.71 KG/M2 | SYSTOLIC BLOOD PRESSURE: 128 MMHG | WEIGHT: 179 LBS

## 2024-12-05 DIAGNOSIS — F32.A ANXIETY AND DEPRESSION: ICD-10-CM

## 2024-12-05 DIAGNOSIS — F41.9 ANXIETY AND DEPRESSION: ICD-10-CM

## 2024-12-05 DIAGNOSIS — N95.1 PERIMENOPAUSAL: Primary | ICD-10-CM

## 2024-12-05 DIAGNOSIS — R63.5 WEIGHT GAIN: ICD-10-CM

## 2024-12-05 PROCEDURE — 99203 OFFICE O/P NEW LOW 30 MIN: CPT | Performed by: OBSTETRICS & GYNECOLOGY

## 2024-12-05 PROCEDURE — 1036F TOBACCO NON-USER: CPT | Performed by: OBSTETRICS & GYNECOLOGY

## 2024-12-05 PROCEDURE — 3008F BODY MASS INDEX DOCD: CPT | Performed by: OBSTETRICS & GYNECOLOGY

## 2024-12-05 RX ORDER — BUPROPION HYDROCHLORIDE 150 MG/1
150 TABLET ORAL EVERY MORNING
Qty: 90 TABLET | Refills: 1 | Status: SHIPPED | OUTPATIENT
Start: 2024-12-05 | End: 2025-06-03

## 2024-12-05 ASSESSMENT — PAIN SCALES - GENERAL: PAINLEVEL_OUTOF10: 0-NO PAIN

## 2024-12-05 NOTE — PROGRESS NOTES
Subjective   Patient ID: Ivette Molina is a 49 y.o. female who presents for New Patient Visit (NPV=  Menopause//LAST PAP:  11/15/2023- WNL -HPV/LAST MAMM:  01/2024//Chaperone Declined: CRISTIAN Mullen/).  Going through some changes.  Weight gain, anxiety.   Hot flashes, but not crazy, related to caffeine, wine especially and also sugar.  Has a Mirena, her second one was placed 4 years ago.  No periods.  Had been on zoloft previously.   Just prescribed wellbutrin because it may work faster than Zoloft.  PCP thought she should see GYN.    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  Exercise- outdoor exercise womens group [employer] 1+ times per week    Review of Systems    Objective   Physical Exam  Constitutional:       Appearance: Normal appearance. She is obese.   Neurological:      General: No focal deficit present.      Mental Status: She is alert and oriented to person, place, and time.   Psychiatric:         Mood and Affect: Mood normal.         Behavior: Behavior normal.         Assessment/Plan   Problem List Items Addressed This Visit             ICD-10-CM       Ob-Gyn Problems    Perimenopausal - Primary N95.1     Other Visit Diagnoses         Codes    Weight gain     R63.5    Relevant Orders    Referral to Nutrition Services        We discussed that weight gain is common with perimenopause.  Previous diets and exercise often need to be revamped to accommodate the new phase of your like. I placed a referral for a dietician.  Daily aerobic and muscle building exercises are important.  We also discussed that mood changes are common.  Treatments are generally antidepressants, or possibly estrogen. You plan to start your new medication, but let me know if you would like to try estrogen.         Jacqueline Zuñiga MD 12/05/24 3:14 PM

## 2024-12-06 ENCOUNTER — OFFICE VISIT (OUTPATIENT)
Dept: ORTHOPEDIC SURGERY | Facility: CLINIC | Age: 49
End: 2024-12-06
Payer: COMMERCIAL

## 2024-12-06 ENCOUNTER — APPOINTMENT (OUTPATIENT)
Dept: OPHTHALMOLOGY | Facility: CLINIC | Age: 49
End: 2024-12-06
Payer: COMMERCIAL

## 2024-12-06 ENCOUNTER — HOSPITAL ENCOUNTER (OUTPATIENT)
Dept: RADIOLOGY | Facility: CLINIC | Age: 49
Discharge: HOME | End: 2024-12-06
Payer: COMMERCIAL

## 2024-12-06 DIAGNOSIS — M25.511 RIGHT SHOULDER PAIN, UNSPECIFIED CHRONICITY: ICD-10-CM

## 2024-12-06 DIAGNOSIS — M75.01 ADHESIVE CAPSULITIS OF RIGHT SHOULDER: ICD-10-CM

## 2024-12-06 DIAGNOSIS — M25.512 LEFT SHOULDER PAIN, UNSPECIFIED CHRONICITY: ICD-10-CM

## 2024-12-06 DIAGNOSIS — M25.611 DECREASED RANGE OF MOTION OF RIGHT SHOULDER: ICD-10-CM

## 2024-12-06 PROBLEM — N95.1 PERIMENOPAUSAL: Status: ACTIVE | Noted: 2024-12-06

## 2024-12-06 PROCEDURE — 73030 X-RAY EXAM OF SHOULDER: CPT | Mod: LT

## 2024-12-06 PROCEDURE — 99203 OFFICE O/P NEW LOW 30 MIN: CPT | Performed by: STUDENT IN AN ORGANIZED HEALTH CARE EDUCATION/TRAINING PROGRAM

## 2024-12-06 PROCEDURE — 2500000004 HC RX 250 GENERAL PHARMACY W/ HCPCS (ALT 636 FOR OP/ED): Performed by: STUDENT IN AN ORGANIZED HEALTH CARE EDUCATION/TRAINING PROGRAM

## 2024-12-06 PROCEDURE — 99213 OFFICE O/P EST LOW 20 MIN: CPT | Mod: 25 | Performed by: STUDENT IN AN ORGANIZED HEALTH CARE EDUCATION/TRAINING PROGRAM

## 2024-12-06 RX ADMIN — TRIAMCINOLONE ACETONIDE 80 MG: 400 INJECTION, SUSPENSION INTRA-ARTICULAR; INTRAMUSCULAR at 23:18

## 2024-12-06 RX ADMIN — ROPIVACAINE HYDROCHLORIDE 4 ML: 5 INJECTION EPIDURAL; INFILTRATION; PERINEURAL at 23:18

## 2024-12-06 RX ADMIN — LIDOCAINE HYDROCHLORIDE 4 ML: 10 INJECTION, SOLUTION EPIDURAL; INFILTRATION; INTRACAUDAL; PERINEURAL at 23:18

## 2024-12-06 ASSESSMENT — PAIN - FUNCTIONAL ASSESSMENT: PAIN_FUNCTIONAL_ASSESSMENT: 0-10

## 2024-12-06 ASSESSMENT — PAIN DESCRIPTION - DESCRIPTORS: DESCRIPTORS: SORE

## 2024-12-06 ASSESSMENT — PAIN SCALES - GENERAL: PAINLEVEL_OUTOF10: 6

## 2025-01-07 ENCOUNTER — TELEPHONE (OUTPATIENT)
Dept: ORTHOPEDIC SURGERY | Facility: HOSPITAL | Age: 50
End: 2025-01-07
Payer: COMMERCIAL

## 2025-01-07 NOTE — TELEPHONE ENCOUNTER
I left a voicemail for Mrs. Molina to call the office back to reschedule her appointment on 1/10/25 because the doctor will not be in.

## 2025-01-08 ENCOUNTER — TELEPHONE (OUTPATIENT)
Dept: ORTHOPEDIC SURGERY | Facility: HOSPITAL | Age: 50
End: 2025-01-08
Payer: COMMERCIAL

## 2025-01-08 NOTE — TELEPHONE ENCOUNTER
Mrs. Molina returned my call to reschedule her telehealth visit, but I was on the other line. I called back and left a voicemail for her to call back.

## 2025-01-09 ENCOUNTER — APPOINTMENT (OUTPATIENT)
Facility: CLINIC | Age: 50
End: 2025-01-09
Payer: COMMERCIAL

## 2025-01-10 ENCOUNTER — APPOINTMENT (OUTPATIENT)
Dept: ORTHOPEDIC SURGERY | Facility: HOSPITAL | Age: 50
End: 2025-01-10
Payer: COMMERCIAL

## 2025-01-13 ENCOUNTER — APPOINTMENT (OUTPATIENT)
Dept: RADIOLOGY | Facility: CLINIC | Age: 50
End: 2025-01-13
Payer: COMMERCIAL

## 2025-01-15 ENCOUNTER — EVALUATION (OUTPATIENT)
Dept: PHYSICAL THERAPY | Facility: CLINIC | Age: 50
End: 2025-01-15
Payer: COMMERCIAL

## 2025-01-15 DIAGNOSIS — M25.511 CHRONIC RIGHT SHOULDER PAIN: Primary | ICD-10-CM

## 2025-01-15 DIAGNOSIS — G89.29 CHRONIC RIGHT SHOULDER PAIN: Primary | ICD-10-CM

## 2025-01-15 DIAGNOSIS — M25.512 LEFT SHOULDER PAIN: ICD-10-CM

## 2025-01-15 DIAGNOSIS — M75.01 ADHESIVE CAPSULITIS OF RIGHT SHOULDER: ICD-10-CM

## 2025-01-15 PROCEDURE — 97110 THERAPEUTIC EXERCISES: CPT | Mod: GP

## 2025-01-15 PROCEDURE — 97161 PT EVAL LOW COMPLEX 20 MIN: CPT | Mod: GP

## 2025-01-15 ASSESSMENT — PAIN - FUNCTIONAL ASSESSMENT: PAIN_FUNCTIONAL_ASSESSMENT: 0-10

## 2025-01-15 ASSESSMENT — PAIN SCALES - GENERAL: PAINLEVEL_OUTOF10: 4

## 2025-01-15 NOTE — PROGRESS NOTES
Physical Therapy Evaluation and Treatment      Patient Name: Ivette Molina  MRN: [id number]  Today's Date: 1/15/2025    Time Entry:   Time Calculation  Start Time: 1248  Stop Time: 1318  Time Calculation (min): 30 min  Visit Number: 1  Approved Visits: 20  Auth required: no      Assessment:  PT Assessment  Rehab Prognosis: Good  Evaluation/Treatment Tolerance: Patient tolerated treatment well   Patient presents with chief complaint of B L>R shoulder pain that has been present since 2023. R shoulder began first, then L shoulder started bothering her more recently. L shoulder is most painful at this time, but feels very limited with mobility B at this time. Patient notes that reaching overhead, reaching behind her back, laying on her side tends to aggravate her sxs. Patient presents with signs and consistent with referral diagnosis of adhesive capsulitis with L shoulder being more acute/irritable. Started her on a gentle mobility program -- discussed not pushing through significant discomfort to avoid aggravating things. Demonstrates and verbalizes understanding. Patient demonstrates decreased shoulder ROM, decreased  shoulder/scapular strength, impaired scapulohumeral rhythm, and increased shoulder pain, which limits her current functional ability. Patient would likely benefit from skilled outpatient PT in order to address the above deficits and return to PLOF.     Plan:  OP PT Plan  Treatment/Interventions: Education/ Instruction, Hot pack, Manual therapy, Neuromuscular re-education, Self care/ home management, Taping techniques, Therapeutic activities, Therapeutic exercises, Vasopneumatic device  PT Plan: Skilled PT  PT Frequency: 1 time per week  Duration: 12 weeks  Number of Treatments Authorized: 20  Rehab Potential: Good  Plan of Care Agreement: Patient    Current Problem:   1. Chronic right shoulder pain        2. Adhesive capsulitis of right shoulder  Referral to Physical Therapy      3. Left shoulder pain             Subjective    General:  General  Reason for Referral: chronic R shoulder pain  Referred By: Dr. Tate  Chief complaint: chronic R shoulder pain that began a couple years ago insidiously. Did some PT last year and helped, but pain returned. States that she was told she had a frozen shoulder. Not much pain now, but very stiff and limited with mobility. Now noticing some pain on the L side. Has been doing her exercises intermittently. Has had injections in both shoulders in December. L shoulder felt better, but pain is returning. Patient is RHD -- has just been avoiding using it too much. Wonders if began when putting luggage in overhead compartment. Likes to workout with a group and has not been able to do push-ups and burpees. R shoulder gets aggravated with quick movements. Notes that reaching OH, behind the back, laying on her side, tends to aggravate things. Has difficulty with dressing and reaching into cupboards. Uses motrin and ibuprofen for pain relief. LUE pain is mostly posterior shoulder and lateral elbow.  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 Imaging reveals mild ACJ OA  PMHx: anxiety, depression, ELSA  : verified with patient  PLOF: independent without restrictions  Medications: see chart for full medication list   Patient goals for PT: reduce pain, improve mobility, improve strength  Medical Screening: Patient denies recent infection/illness, headaches, chest pain, SOB,    Precautions:  Precautions  Precautions Comment: none; no increased fall risk  Pain:  Pain Assessment  Pain Assessment: 0-10  0-10 (Numeric) Pain Score: 4 (6/10 at worst)      Objective   Eval Objective:  Shoulder AROM: slight shoulder shrug with elevation ROM B   Flexion: R:120 L: 120 (+)   Abduction: R: 95 L: 95 (+)   ER (functional): R:C3 L: occiput (+)   IR (functional): R: SIJ L: posterior hip (+)  Shoulder PROM: similar restriction to AROM and reproduces pain in same planes   ER:  R: ~30 L: ~15 (+)  MMT:   Flexion: R:5/5 L:4+/5   Abduction: R:4/5 L:4/5   ER (0*): R:5/5 L:4+/5   IR (0*): R:5/5 L:5/5  Observation: B anteriorly tilted and slightly IR scapulae  Palpation: no ttp throughout shoulder     Outcome Measures:  Other Measures  Disability of Arm Shoulder Hand (DASH): 25=31.82     Treatments:  Access Code: [email or web address]  URL: [email or web address]  Date: 01/15/2025  Prepared by: Venkat Houston    Exercises  - Standing 'L' Stretch at Counter  - 2-3 x daily - 7 x weekly - 2 sets - 10-12 reps  - Supine Shoulder External Rotation with Dowel  - 2-3 x daily - 7 x weekly - 2 sets - 10-12 reps  - Standing Shoulder Extension AAROM with Dowel  - 2-3 x daily - 7 x weekly - 2 sets - 10-12 reps    Edu and review general progression with frozen shoulder  Edu gentle stretch, especially on L, to avoid aggravating things too much    EDUCATION:  Outpatient Education  Individual(s) Educated: Patient  Education Provided: Home Exercise Program  Risk and Benefits Discussed with Patient/Caregiver/Other: yes  Patient/Caregiver Demonstrated Understanding: yes  Plan of Care Discussed and Agreed Upon: yes  Patient Response to Education: Patient/Caregiver Verbalized Understanding of Information  Education Comment: review HEP    Goals:  Patient will demonstrate improvement in QuickDASH score by at least 12 points in order to meet MCID  Patient will demonstrate full shoulder AROM without pain  Patient will demonstrate at least 4+/5 strength shoulder and scapular musculature in all planes without pain  Patient will be able to perform dressing and other household activities without pain or compensation  Patient will be able to sleep through the night without pain or compensation  Patient will be able to return to recreational exercise program without pain or compensation  Patient will demonstrate good scapulohumeral rhythm with overhead shoulder motion  Patient will demonstrate I with HEP

## 2025-01-22 ENCOUNTER — APPOINTMENT (OUTPATIENT)
Dept: ORTHOPEDIC SURGERY | Facility: CLINIC | Age: 50
End: 2025-01-22
Payer: COMMERCIAL

## 2025-01-27 DIAGNOSIS — F41.9 ANXIETY AND DEPRESSION: Primary | ICD-10-CM

## 2025-01-27 DIAGNOSIS — F32.A ANXIETY AND DEPRESSION: Primary | ICD-10-CM

## 2025-01-27 RX ORDER — ESCITALOPRAM OXALATE 5 MG/1
5 TABLET ORAL DAILY
Qty: 30 TABLET | Refills: 4 | Status: SHIPPED | OUTPATIENT
Start: 2025-01-27

## 2025-01-28 ENCOUNTER — NUTRITION (OUTPATIENT)
Dept: NUTRITION | Facility: CLINIC | Age: 50
End: 2025-01-28
Payer: COMMERCIAL

## 2025-01-28 VITALS — BODY MASS INDEX: 31.71 KG/M2 | HEIGHT: 63 IN

## 2025-01-28 DIAGNOSIS — R63.5 WEIGHT GAIN: ICD-10-CM

## 2025-01-28 PROCEDURE — 97802 MEDICAL NUTRITION INDIV IN: CPT | Performed by: DIETITIAN, REGISTERED

## 2025-01-28 NOTE — PATIENT INSTRUCTIONS
Follow the Healthy Plate Method at meals- see handout.  This will help to increase your vegetable intake and decrease portion sizes of carbohydrates.   - Check out [email or web address] or [employer].org for Mediterranean meal plans and recipes ideas.    When eating starchy foods, try to eat whole grains like potato with the skin, whole grain breads and cereals, brown rices and pastas.  Include protein with all meals and snacks.  Lean protein are better for cholesterol levels such as chicken(no skin), fish (baked or broiled or grilled), low-fat dairy, eggs, and peanut butter or nuts.   - For high protein snack ideas check out: https://www.[employer]/article/278006/10-high-protein-snacks-that-keep-you-feeling-full-longer/  - Protein drinks between meals such as Premier Protein, Muscle Milk or Fairlife can help curb appetite.  4.   Reduce your weight by 1-2# per week to reach your goal weight.      -  Check out Intermittent Fasting video by helen Intermittent Fasting 101 by Shawanda John   -  Consider occasionally tracking your calorie intake on an melissa such as [employer] or [employer] to track your calories.  Aim for 9515-6686 calories and 80-90g of protein per day.    5.   Increase fiber to 25-3g per day to help keep you well[place] and lower cholesterol levels.  You may consider a fiber supplement such as Benefiber or Metamucil everyday.    6.   Aim to drink 64 oz of water daily  7.   Aim for 30 minutes of exercise on most days.

## 2025-01-28 NOTE — PROGRESS NOTES
"Nutrition Assessment     Reason for Visit:  Ivette Molina is a 50 y.o. female who presents for nutrition counseling in the context of unintentional weight gain.    Anthropometrics:  Wt Readings from Last 10 Encounters:   12/05/24 81.2 kg (179 lb)   11/21/24 77.6 kg (171 lb)   06/06/24 79.4 kg (175 lb)   11/15/23 78.5 kg (173 lb)   08/09/23 75.3 kg (166 lb)   07/27/22 75.6 kg (166 lb 9.6 oz)   05/19/22 77.2 kg (170 lb 4 oz)   02/17/22 71.7 kg (158 lb)   12/02/21 75.1 kg (165 lb 9 oz)   10/18/21 74.8 kg (165 lb)     Meds: Lexapro  Anthropometrics  Height: 160 cm (5' 3\")  Food And Nutrient Intake:  Food and Nutrient History  Sleep Duration/Quality: 7+ hrs disrupted     Food Intake  Meal 1: egg sandwich; fruit, cheese  Meal 2: Lunch: leftovers or frozen meal; soup; pasta  Snacks : chips or goldfish, pretzels, dried apricots and almonds  Meal 3: dinner: [place], roasted fennel and tomatoes; limits pasta to once per week; beef- chili; chicken; salmon  Snacks: Not an evening snacker  Fluid Intake: tea, coffee, milk or oatmilk, juice, water with lemon  Pattern of Alcohol Consumption: social drinker- wine     Bioactive Substance Intake  Pattern of Alcohol Consumption: social drinker- wine     Medication and Complementary/Alternative Medicine Use  OTC Medication Use: Ca+    Physical Activities:  Physical Activity  Physical Activity History: 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      Energy Needs  Calculated Energy Needs Using Equations  Height: 160 cm (5' 3\")  Weight Used for Equation Calculations: 80.3 kg (177 lb)  [place]- . Richy Equation (Overweight or Obese Patients): 1387  Activity Factor: 1.2  Total Energy Needs: 1664  Estimated Energy Needs  Total Energy Estimated Needs in 24 hours (kCal): 1200 kCal  Method for Estimating " Needs: MIGUEL x 1.2-500  Estimated Protein Needs  Total Protein Estimated Needs in 24 Hours (g): 90 g    Nutrition Diagnosis   Malnutrition Diagnosis  Patient has Malnutrition Diagnosis: No  Nutrition Diagnosis  Patient has Nutrition Diagnosis: No  Additional Nutrition Diagnosis: Diagnosis 2  Diagnosis Status (2): New  Nutrition Diagnosis 2: Unintended weight gain  Related to (2): predicted excessive energy intake  As Evidenced by (2): pt interview; diet recall    Nutrition Interventions/Recommendations   Nutrition Education  Nutrition Education Content: Content related nutrition education, Education on nutrition's influence on health, Physical activity guidance  Goals: Instructed on counting macronutrient intake, proper portion sizes, and general healthful nutrition. Instructed on benefits of wt loss with heart health. Discussed mindful eating, slow gradual wt loss and goals beyond wt. Instructed pt to not skip meals - discussed this may lead to over eating later or snacking more than planned. Instructed on importance of physical activity for wt loss and maintenance of wt loss. Both verbal and written education provided in the one-on-one setting. Pt verbalized understanding of information provided. All questions answered to pt satisfaction throughout visit    Patient Instructions   Follow the Healthy Plate Method at meals- see handout.  This will help to increase your vegetable intake and decrease portion sizes of carbohydrates.   - Check out [employer] or [employer].org for Mediterranean meal plans and recipes ideas.    When eating starchy foods, try to eat whole grains like potato with the skin, whole grain breads and cereals, brown rices and pastas.  Include protein with all meals and snacks.  Lean protein are better for cholesterol levels such as chicken(no skin), fish (baked or broiled or grilled), low-fat dairy, eggs, and peanut butter or nuts.   - For high protein snack ideas check out:  https://www.[employer].com/article/513141/10-high-protein-snacks-that-keep-you-feeling-full-longer/  - Protein drinks between meals such as Premier Protein, Muscle Milk or Fairlife can help curb appetite.  4.   Reduce your weight by 1-2# per week to reach your goal weight.      -  Check out Intermittent Fasting video by helen Intermittent Fasting 101 by Shawanda John   -  Consider occasionally tracking your calorie intake on an melissa such as [employer] or [employer] to track your calories.  Aim for 2050-2760 calories and 80-90g of protein per day.    5.   Increase fiber to 25-3g per day to help keep you well[place] and lower cholesterol levels.  You may consider a fiber supplement such as Benefiber or Metamucil everyday.    6.   Aim to drink 64 oz of water daily  7.   Aim for 30 minutes of exercise on most days.        Nutrition Monitoring and Evaluation   Food and Nutrient Intake  Monitoring and Evaluation Plan: Energy intake, Meal/snack pattern, Fiber intake, Carbohydrate intake, Amount of food, Fluid intake, Protein intake  Criteria: 3759-9097 kcal/day  Criteria: aim for at least 64 oz of water daily  Criteria: Aim for 3 meals/day with 1-2 snack  Meal/Snack Pattern: Food variety, Estimated meal and snack pattern  Criteria: Follow plate method when planning meals (1/2 plate non-starchy vegetables, 1/4 plate lean protein, 1/4 plate carbohydrates).  Criteria: Choose lean protein sources including chicken, turkey, seafood, and eggs. Be sure to include protein with each meal and snack  Criteria: Limit added sugar to less than 25 g per day  Criteria: Increase fiber from fruits, vegetables, whole grains, and beans/lentils  Additional Plan: Provided pt with the following handouts: wt loss tips, 1200 calorie 5 day meal plan, high proteins/snacks food list, exercise, plate method  Knowledge Belief Attitude Determination   Monitoring and Evaluation Plan: Physical activity  Criteria: Encouraged regular physical activity including  strength training as medically appropriate per AHA guidelines  Anthropometric measurements  Monitoring and Evaluation Plan: Weight change  Criteria: 1 lb wt loss per week or weight maintenance          Time Spent  Time spent directly with patient, family or caregiver: 45 minutes  Documentation Time: 10 minutes

## 2025-02-05 ENCOUNTER — APPOINTMENT (OUTPATIENT)
Dept: ORTHOPEDIC SURGERY | Facility: CLINIC | Age: 50
End: 2025-02-05
Payer: COMMERCIAL

## 2025-02-05 DIAGNOSIS — M75.02 ADHESIVE CAPSULITIS OF LEFT SHOULDER: Primary | ICD-10-CM

## 2025-02-05 NOTE — PROGRESS NOTES
This is documentation of a telehealth visit held with the patient on 2/5/2025.  The services were conducted utilizing audio modality.  I performed this visit using real-time telehealth tools, including an audio/visual connection.  Connection was maintained between myself, Reinaldo Tate located in [facility], and the patient located in [place], [place].  I obtained verbal consent from the patient for this evaluation.    The patient presents for follow-up evaluation of left shoulder adhesive capsulitis.  She feels she is making mild improvements but is not back to full range of motion.  She will follow-up in person in the next 4 or 6 weeks.    Frozen shoulder on R is improving  R shoulder is improving  Minimal pain    L shoulder continuing to worsen

## 2025-02-16 PROCEDURE — 20610 DRAIN/INJ JOINT/BURSA W/O US: CPT | Performed by: STUDENT IN AN ORGANIZED HEALTH CARE EDUCATION/TRAINING PROGRAM

## 2025-02-16 RX ORDER — TRIAMCINOLONE ACETONIDE 40 MG/ML
80 INJECTION, SUSPENSION INTRA-ARTICULAR; INTRAMUSCULAR
Status: COMPLETED | OUTPATIENT
Start: 2024-12-06 | End: 2024-12-06

## 2025-02-16 RX ORDER — LIDOCAINE HYDROCHLORIDE 10 MG/ML
4 INJECTION, SOLUTION EPIDURAL; INFILTRATION; INTRACAUDAL; PERINEURAL
Status: COMPLETED | OUTPATIENT
Start: 2024-12-06 | End: 2024-12-06

## 2025-02-16 RX ORDER — ROPIVACAINE HYDROCHLORIDE 5 MG/ML
4 INJECTION, SOLUTION EPIDURAL; INFILTRATION; PERINEURAL
Status: COMPLETED | OUTPATIENT
Start: 2024-12-06 | End: 2024-12-06

## 2025-02-17 NOTE — PROGRESS NOTES
PRIMARY CARE PHYSICIAN: Mila Simmons DO  REFERRING PROVIDER: Mila Simmons DO  [address]  [address]  [place] [id number]     CONSULT ORTHOPAEDIC: Shoulder Evaluation    ASSESSMENT & PLAN    Impression/Plan: This is a 50-year-old female presenting for evaluation of left shoulder pain and decreased motion.  At this time, based on clinical history and physical examination I believe she is suffering from left shoulder adhesive capsulitis.  We discussed this diagnosis.  I recommended a corticosteroid injection and physical therapy.  She will follow-up in 4 to 6 weeks for repeat evaluation.  All questions were answered.    SUBJECTIVE  CHIEF COMPLAINT:   Chief Complaint   Patient presents with    Left Shoulder - Pain     NPV - left shoulder pain.         HPI: Ivette Molina is a 50 y.o. patient. Ivette Molina presents for evaluation of left shoulder pain and decreased motion.  She denies any distal numbness or tingling.  No history of injuries or falls.  She has no previous history of injections or surgeries to the left shoulder.  She has decreased motion which is associated with pain.  This is inhibiting her activities of daily living.  She tries over-the-counter anti-inflammatories.  She is also been in physical therapy in the past, however has not been in physical therapy for several months.  She presents today for repeat evaluation and further management options.    REVIEW OF SYSTEMS  Constitutional: See HPI for pain assessment, No significant weight loss, recent trauma  Cardiovascular: No chest pain, shortness of breath  Respiratory: No difficulty breathing, cough  Gastrointestinal: No nausea, vomiting, diarrhea, constipation  Musculoskeletal: Noted in HPI, positive for pain, restricted motion, stiffness  Integumentary: No rashes, easy bruising, redness   Neurological: no numbness or tingling in extremities, no gait disturbances   Psychiatric: No mood changes, memory changes, social  issues  Heme/Lymph: no excessive swelling, easy bruising, excessive bleeding  ENT: No hearing changes  Eyes: No vision changes    Past Medical History:   Diagnosis Date    Personal history of Methicillin resistant Staphylococcus aureus infection     MRSA infection greater than 3 months ago        No Known Allergies     Past Surgical History:   Procedure Laterality Date    OTHER SURGICAL HISTORY  12/09/2016    Surgical Equipment Laser Devices    STRABISMUS SURGERY  07/16/2014    Strabismus Surgery        Family History   Problem Relation Name Age of Onset    Other (fatty liver) Mother      Arthritis Father      Stroke Father      Hypertension Father      Asthma Sister      Melanoma Sister      Asthma Brother      Neurofibromatosis Brother      Diabetes type II Maternal Grandmother      Parkinsonism Maternal Grandfather      Stomach cancer Paternal Grandmother          Social History     Socioeconomic History    Marital status:      Spouse name: Not on file    Number of children: Not on file    Years of education: Not on file    Highest education level: Not on file   Occupational History    Not on file   Tobacco Use    Smoking status: Former     Types: Cigarettes    Smokeless tobacco: Never   Vaping Use    Vaping status: Never Used   Substance and Sexual Activity    Alcohol use: Yes     Alcohol/week: 3.0 standard drinks of alcohol     Types: 3 Standard drinks or equivalent per week    Drug use: Never    Sexual activity: Yes     Partners: Male     Birth control/protection: I.U.D.   Other Topics Concern    Not on file   Social History Narrative    Not on file     Social Drivers of Health     Financial Resource Strain: Not on file   Food Insecurity: Not on file   Transportation Needs: Not on file   Physical Activity: Not on file   Stress: Not on file   Social Connections: Not on file   Intimate Partner Violence: Not on file   Housing Stability: Not on file        CURRENT MEDICATIONS:   Current Outpatient  "Medications   Medication Sig Dispense Refill    buPROPion XL (Wellbutrin XL) 150 mg 24 hr tablet TAKE 1 TABLET (150 MG) BY MOUTH ONCE DAILY IN THE MORNING. DO NOT CRUSH, CHEW, OR SPLIT. 90 tablet 1    clotrimazole-betamethasone (Lotrisone) cream Apply 1 Application topically 2 times a day. 45 g 0    levonorgestrel (Mirena) 21 mcg/24 hours (8 yrs) 52 mg IUD 52 mg by intrauterine route 1 time.      nystatin (Mycostatin) cream APPLY A THIN LAYER TOPICALLY TO AFFECTED AREA(S) AND RUB IN WELL TWICE DAILY 30 g 0    escitalopram (Lexapro) 5 mg tablet Take 1 tablet (5 mg) by mouth once daily. 30 tablet 4    fluticasone (Flonase) 50 mcg/actuation nasal spray Administer 2 sprays into each nostril once daily. (Patient not taking: Reported on 12/6/2024)       No current facility-administered medications for this visit.        OBJECTIVE    PHYSICAL EXAM      7/27/2022     9:44 AM 8/9/2023     1:12 PM 11/15/2023     9:45 AM 6/6/2024     3:57 PM 11/21/2024    12:23 PM 12/5/2024     3:05 PM 1/28/2025     9:22 AM   Vitals   Systolic 120 123 122 132 134 128    Diastolic 80 79 80 88 80 82    BP Location   Left arm  Right arm     Heart Rate 60  60 76 76     Temp 36.6 °C (97.9 °F) 36.8 °C (98.2 °F) 36.6 °C (97.8 °F)  36.8 °C (98.2 °F)     Resp 16  16 18 18     Height 1.575 m (5' 2\") 1.575 m (5' 2\") 1.6 m (5' 3\") 1.6 m (5' 3\") 1.6 m (5' 3\") 1.6 m (5' 3\") 1.6 m (5' 3\")   Weight (lb) 166.6 166 173 175 171 179    BMI 30.47 kg/m2 30.36 kg/m2 30.65 kg/m2 31 kg/m2 30.29 kg/m2 31.71 kg/m2 31.71 kg/m2   BSA (m2) 1.82 m2 1.81 m2 1.87 m2 1.88 m2 1.86 m2 1.9 m2 1.9 m2   Visit Report   Report Report Report Report       There is no height or weight on file to calculate BMI.    GENERAL: A/Ox3, NAD. Appears healthy, well nourished  PSYCHIATRIC: Mood stable, appropriate memory recall  EYES: EOM intact, no scleral icterus  CARDIAC: regular rate  LUNGS: Breathing non-labored  SKIN: no erythema, rashes, or ecchymoses     MUSCULOSKELETAL:  This is a " well-appearing patient in no acute distress.    There is no tenderness to palpation along the SC joint, AC joint, clavicle, acromion, or spine of the scapula.  There is no tenderness along the proximal aspect of the biceps tendon.  Active range of motion: forward flexion 120 degrees, abduction 100 degrees, external rotation 10 degrees, internal rotation to lumbar spine.  Strength: 5/5 to the supraspinatus, infraspinatus, subscapularis.  Negative Hornblower's.  Stability: Anterior/Posterior load and shift stable  Negative Speed's and Yergason's.  Negative Calhoun's.  Positive Neer and Sher.  The patient is firing the AIN/PIN/median/radial/ulnar/axillary distributions.  The patient is sensate to light touch in the median/radial/ulnar/axillary distributions.  2+ radial pulse.    NEUROVASCULAR:  - Neurovascular Status: sensation intact to light touch distally, upper extremity motor grossly intact  - Capillary refill brisk at extremities, radial pulse 2+    Imaging: Multiple views of the affected left shoulder(s) demonstrate: There is no evidence of acute fracture or dislocation.  Well-preserved glenohumeral joint spacing.  Normal acromiohumeral interval.   X-rays were personally reviewed and interpreted by me.  Radiology reports were reviewed by me as well, if readily available at the time.    L Inj/Asp: R glenohumeral on 12/6/2024 11:18 PM  Indications: pain  Details: 22 G needle, posterior approach  Medications: 80 mg triamcinolone acetonide 40 mg/mL; 4 mL lidocaine PF 10 mg/mL (1 %); 4 mL ropivacaine 0.5 % (5 mg/mL)         Bert Tate MD, MPH  Attending Surgeon  Sports Medicine Orthopaedic Surgery  [facility] [place]

## 2025-03-02 DIAGNOSIS — F41.9 ANXIETY AND DEPRESSION: ICD-10-CM

## 2025-03-02 DIAGNOSIS — F32.A ANXIETY AND DEPRESSION: ICD-10-CM

## 2025-03-03 RX ORDER — ESCITALOPRAM OXALATE 5 MG/1
5 TABLET ORAL DAILY
Qty: 90 TABLET | Refills: 1 | Status: SHIPPED | OUTPATIENT
Start: 2025-03-03

## 2025-03-05 ENCOUNTER — APPOINTMENT (OUTPATIENT)
Dept: ORTHOPEDIC SURGERY | Facility: CLINIC | Age: 50
End: 2025-03-05
Payer: COMMERCIAL

## 2025-03-05 DIAGNOSIS — M75.02 ADHESIVE CAPSULITIS OF LEFT SHOULDER: ICD-10-CM

## 2025-03-05 PROCEDURE — 99213 OFFICE O/P EST LOW 20 MIN: CPT | Performed by: STUDENT IN AN ORGANIZED HEALTH CARE EDUCATION/TRAINING PROGRAM

## 2025-03-05 PROCEDURE — 20610 DRAIN/INJ JOINT/BURSA W/O US: CPT | Performed by: STUDENT IN AN ORGANIZED HEALTH CARE EDUCATION/TRAINING PROGRAM

## 2025-03-05 RX ORDER — TRIAMCINOLONE ACETONIDE 40 MG/ML
80 INJECTION, SUSPENSION INTRA-ARTICULAR; INTRAMUSCULAR
Status: COMPLETED | OUTPATIENT
Start: 2025-03-05 | End: 2025-03-05

## 2025-03-05 RX ORDER — ROPIVACAINE HYDROCHLORIDE 5 MG/ML
4 INJECTION, SOLUTION EPIDURAL; INFILTRATION; PERINEURAL
Status: COMPLETED | OUTPATIENT
Start: 2025-03-05 | End: 2025-03-05

## 2025-03-05 RX ORDER — LIDOCAINE HYDROCHLORIDE 10 MG/ML
4 INJECTION, SOLUTION EPIDURAL; INFILTRATION; INTRACAUDAL; PERINEURAL
Status: COMPLETED | OUTPATIENT
Start: 2025-03-05 | End: 2025-03-05

## 2025-03-05 RX ADMIN — ROPIVACAINE HYDROCHLORIDE 4 ML: 5 INJECTION, SOLUTION EPIDURAL; INFILTRATION; PERINEURAL at 21:28

## 2025-03-05 RX ADMIN — TRIAMCINOLONE ACETONIDE 80 MG: 40 INJECTION, SUSPENSION INTRA-ARTICULAR; INTRAMUSCULAR at 21:28

## 2025-03-05 RX ADMIN — LIDOCAINE HYDROCHLORIDE 4 ML: 10 INJECTION, SOLUTION EPIDURAL; INFILTRATION; INTRACAUDAL; PERINEURAL at 21:28

## 2025-03-05 ASSESSMENT — PAIN - FUNCTIONAL ASSESSMENT: PAIN_FUNCTIONAL_ASSESSMENT: 0-10

## 2025-03-05 ASSESSMENT — PAIN DESCRIPTION - DESCRIPTORS: DESCRIPTORS: ACHING;THROBBING

## 2025-03-05 ASSESSMENT — PAIN SCALES - GENERAL: PAINLEVEL_OUTOF10: 6

## 2025-03-06 NOTE — PROGRESS NOTES
PRIMARY CARE PHYSICIAN: Mila Simmons,     ORTHOPAEDIC FOLLOW-UP: Shoulder Evaluation        ASSESSMENT & PLAN    Impression: 50 y.o. female presenting for follow-up evaluation of left shoulder adhesive capsulitis.  She previously underwent a corticosteroid injection in December 2024 with initial relief of symptoms.  She has been in physical therapy, however was not provided passive range of motion exercises.  At this time, I believe she would benefit from a repeat corticosteroid injection.  In addition, we will restart physical therapy utilizing a different outlet.  She will follow-up in roughly 6 weeks for repeat evaluation and discussion.  If she fails to improve with this more recent injection and physical therapy we may need to consider operative intervention in the future.  All questions were answered.    Plan:   I reviewed with the patient the nature of their diagnosis.  I reviewed their imaging studies with them.    Based on the history, physical exam and imaging studies above, the patient's presentation is consistent with the above diagnosis.  I had a long discussion with the patient regarding their presentation and the treatment options.  We discussed initial nonoperative versus operative management options as well as potential further diagnostic imaging.    Follow-Up: Patient will follow-up in 6 weeks    At the end of the visit, all questions were answered in full. The patient is in agreement with the plan and recommendations. They will call the office with any questions/concerns.      Note dictated with [employer] software. Completed without full typed error editing and sent to avoid delay.       SUBJECTIVE  CHIEF COMPLAINT:   Chief Complaint   Patient presents with   • Left Shoulder - Follow-up, Pain        HPI: Ivette Molina is a 50 y.o. patient. Ivette Molina presents for follow-up evaluation of left shoulder adhesive capsulitis.  She was previously evaluated in December  2024 and provided a corticosteroid injection.  She was also initiated on a physical therapy course.  She states that she was discharged from the PT practice and sent home with a home exercise program.  She does not feel she is significantly improved in regards to her motion or pain.  No new injuries or falls.  She continues to deny distal numbness or tingling.    REVIEW OF SYSTEMS  Constitutional: See HPI for pain assessment, No significant weight loss, recent trauma  Cardiovascular: No chest pain, shortness of breath  Respiratory: No difficulty breathing, cough  Gastrointestinal: No nausea, vomiting, diarrhea, constipation  Musculoskeletal: Noted in HPI, positive for pain, restricted motion, stiffness  Integumentary: No rashes, easy bruising, redness   Neurological: no numbness or tingling in extremities, no gait disturbances   Psychiatric: No mood changes, memory changes, social issues  Heme/Lymph: no excessive swelling, easy bruising, excessive bleeding  ENT: No hearing changes  Eyes: No vision changes    Past Medical History:   Diagnosis Date   • Personal history of Methicillin resistant Staphylococcus aureus infection     MRSA infection greater than 3 months ago        No Known Allergies     Past Surgical History:   Procedure Laterality Date   • OTHER SURGICAL HISTORY  12/09/2016    Surgical Equipment Laser Devices   • STRABISMUS SURGERY  07/16/2014    Strabismus Surgery        Family History   Problem Relation Name Age of Onset   • Other (fatty liver) Mother     • Arthritis Father     • Stroke Father     • Hypertension Father     • Asthma Sister     • Melanoma Sister     • Asthma Brother     • Neurofibromatosis Brother     • Diabetes type II Maternal Grandmother     • Parkinsonism Maternal Grandfather     • Stomach cancer Paternal Grandmother          Social History     Socioeconomic History   • Marital status:      Spouse name: Not on file   • Number of children: Not on file   • Years of education: Not  on file   • Highest education level: Not on file   Occupational History   • Not on file   Tobacco Use   • Smoking status: Former     Types: Cigarettes   • Smokeless tobacco: Never   Vaping Use   • Vaping status: Never Used   Substance and Sexual Activity   • Alcohol use: Yes     Alcohol/week: 3.0 standard drinks of alcohol     Types: 3 Standard drinks or equivalent per week   • Drug use: Never   • Sexual activity: Yes     Partners: Male     Birth control/protection: I.U.D.   Other Topics Concern   • Not on file   Social History Narrative   • Not on file     Social Drivers of Health     Financial Resource Strain: Not on file   Food Insecurity: Not on file   Transportation Needs: Not on file   Physical Activity: Not on file   Stress: Not on file   Social Connections: Not on file   Intimate Partner Violence: Not on file   Housing Stability: Not on file        CURRENT MEDICATIONS:   Current Outpatient Medications   Medication Sig Dispense Refill   • buPROPion XL (Wellbutrin XL) 150 mg 24 hr tablet TAKE 1 TABLET (150 MG) BY MOUTH ONCE DAILY IN THE MORNING. DO NOT CRUSH, CHEW, OR SPLIT. 90 tablet 1   • clotrimazole-betamethasone (Lotrisone) cream Apply 1 Application topically 2 times a day. 45 g 0   • escitalopram (Lexapro) 5 mg tablet TAKE 1 TABLET BY MOUTH EVERY DAY 90 tablet 1   • fluticasone (Flonase) 50 mcg/actuation nasal spray Administer 2 sprays into each nostril once daily. (Patient not taking: Reported on 12/6/2024)     • levonorgestrel (Mirena) 21 mcg/24 hours (8 yrs) 52 mg IUD 52 mg by intrauterine route 1 time.     • nystatin (Mycostatin) cream APPLY A THIN LAYER TOPICALLY TO AFFECTED AREA(S) AND RUB IN WELL TWICE DAILY 30 g 0     No current facility-administered medications for this visit.        OBJECTIVE    PHYSICAL EXAM      7/27/2022     9:44 AM 8/9/2023     1:12 PM 11/15/2023     9:45 AM 6/6/2024     3:57 PM 11/21/2024    12:23 PM 12/5/2024     3:05 PM 1/28/2025     9:22 AM   Vitals   Systolic 120 123  "122 132 134 128    Diastolic 80 79 80 88 80 82    BP Location   Left arm  Right arm     Heart Rate 60  60 76 76     Temp 36.6 °C (97.9 °F) 36.8 °C (98.2 °F) 36.6 °C (97.8 °F)  36.8 °C (98.2 °F)     Resp 16  16 18 18     Height 1.575 m (5' 2\") 1.575 m (5' 2\") 1.6 m (5' 3\") 1.6 m (5' 3\") 1.6 m (5' 3\") 1.6 m (5' 3\") 1.6 m (5' 3\")   Weight (lb) 166.6 166 173 175 171 179    BMI 30.47 kg/m2 30.36 kg/m2 30.65 kg/m2 31 kg/m2 30.29 kg/m2 31.71 kg/m2 31.71 kg/m2   BSA (m2) 1.82 m2 1.81 m2 1.87 m2 1.88 m2 1.86 m2 1.9 m2 1.9 m2   Visit Report   Report Report Report Report       There is no height or weight on file to calculate BMI.    GENERAL: A/Ox3, NAD. Appears healthy, well nourished  PSYCHIATRIC: Mood stable, appropriate memory recall  EYES: EOM intact, no scleral icterus  CARDIOVASCULAR: Palpable peripheral pulses  LUNGS: Breathing non-labored on room air  SKIN: no erythema, rashes, or ecchymosis     MUSCULOSKELETAL:  Laterality: left Shoulder Exam  - Negative Spurlings, full painless neck ROM  - Skin intact  - No erythema or warmth  - No ecchymosis or soft tissue swelling  - Shoulder ROM: Forward flexion 120, external rotation 0, abduction 100.  - Strength:      Jobes 5/5     ER 5/5     Belly press and bearhug 5/5    NEUROVASCULAR:  - Neurovascular Status: sensation intact to light touch distally, upper extremity motor grossly intact  - Capillary refill brisk at extremities, peripheral pulses 2+    Imaging: No new imaging obtained today    L Inj/Asp: L glenohumeral on 3/5/2025 9:28 PM  Indications: pain  Details: 22 G needle, posterior approach  Medications: 80 mg triamcinolone acetonide 40 mg/mL; 4 mL lidocaine PF 10 mg/mL (1 %); 4 mL ropivacaine 5 mg/mL (0.5 %)           Bert Tate MD, MPH  Attending Surgeon    Sports Medicine Orthopaedic Surgery  [facility] [place]  [facility] [employer]   "

## 2025-04-16 ENCOUNTER — APPOINTMENT (OUTPATIENT)
Dept: ORTHOPEDIC SURGERY | Facility: CLINIC | Age: 50
End: 2025-04-16
Payer: COMMERCIAL

## 2025-04-16 PROCEDURE — 99212 OFFICE O/P EST SF 10 MIN: CPT | Performed by: STUDENT IN AN ORGANIZED HEALTH CARE EDUCATION/TRAINING PROGRAM

## 2025-04-16 NOTE — PROGRESS NOTES
This is documentation of a telehealth visit held with the patient on 4/16/2025.  The services were conducted utilizing audio audio modality.  I performed this visit using real-time telehealth tools, including an audio/visual connection.  Connection was maintained between myself, Reinaldo Ttae located in [facility], and the patient located in [place], [place].  I obtained verbal consent from the patient for this evaluation.     The patient was previously evaluated on 3/5/2025 for evaluation of left shoulder adhesive capsulitis.  Since that time, she has been converted to [facility] for therapy.  She reports great relief of her symptoms.  She does have some mild pain but her range of motion is improving.  In therapy, gaining mobilityShe is overall happy with the progress she is making.  She does not feel like she needs an additional in person evaluation.  She will contact our office on an as-needed basis moving forward depending on her symptoms.  We did discuss that based on longevity of her symptoms at this time, she may require operative intervention at this point for possible capsular release.  All questions were answered.

## 2025-05-06 ENCOUNTER — APPOINTMENT (OUTPATIENT)
Dept: OPHTHALMOLOGY | Facility: CLINIC | Age: 50
End: 2025-05-06
Payer: COMMERCIAL

## 2025-05-06 DIAGNOSIS — H18.523 ANTERIOR BASEMENT MEMBRANE DYSTROPHY OF BOTH EYES: ICD-10-CM

## 2025-05-06 DIAGNOSIS — H52.223 REGULAR ASTIGMATISM OF BOTH EYES: ICD-10-CM

## 2025-05-06 DIAGNOSIS — H52.13 MYOPIA OF BOTH EYES: Primary | ICD-10-CM

## 2025-05-06 DIAGNOSIS — H52.4 PRESBYOPIA: ICD-10-CM

## 2025-05-06 DIAGNOSIS — H53.001 AMBLYOPIA OF RIGHT EYE: ICD-10-CM

## 2025-05-06 DIAGNOSIS — H40.003 GLAUCOMA SUSPECT OF BOTH EYES: ICD-10-CM

## 2025-05-06 LAB
AVERAGE RNFL BASELINE (OD): 87
AVERAGE RNFL BASELINE (OS): 86
AVERAGE RNFL TODAY (OD): 86
AVERAGE RNFL TODAY (OS): 87

## 2025-05-06 PROCEDURE — 92014 COMPRE OPH EXAM EST PT 1/>: CPT | Performed by: OPTOMETRIST

## 2025-05-06 PROCEDURE — 92133 CPTRZD OPH DX IMG PST SGM ON: CPT | Performed by: OPTOMETRIST

## 2025-05-06 PROCEDURE — 92015 DETERMINE REFRACTIVE STATE: CPT | Performed by: OPTOMETRIST

## 2025-05-06 ASSESSMENT — REFRACTION_MANIFEST
METHOD_AUTOREFRACTION: 1
OS_AXIS: 160
OS_SPHERE: -2.00
OD_AXIS: 019
OD_SPHERE: -1.75
OD_CYLINDER: -0.75
OS_CYLINDER: -0.50
OD_SPHERE: -1.75
OS_CYLINDER: -0.50
OS_AXIS: 170
OD_CYLINDER: -0.50
OD_AXIS: 015
OS_ADD: +2.00
OD_ADD: +2.00
OS_SPHERE: -2.00

## 2025-05-06 ASSESSMENT — CONF VISUAL FIELD
OS_SUPERIOR_TEMPORAL_RESTRICTION: 0
OD_INFERIOR_TEMPORAL_RESTRICTION: 0
OD_NORMAL: 1
OS_SUPERIOR_NASAL_RESTRICTION: 0
OD_SUPERIOR_TEMPORAL_RESTRICTION: 0
OD_SUPERIOR_NASAL_RESTRICTION: 0
OD_INFERIOR_NASAL_RESTRICTION: 0
OS_INFERIOR_NASAL_RESTRICTION: 0
OS_NORMAL: 1
OS_INFERIOR_TEMPORAL_RESTRICTION: 0
METHOD: COUNTING FINGERS

## 2025-05-06 ASSESSMENT — ENCOUNTER SYMPTOMS
GASTROINTESTINAL NEGATIVE: 0
ENDOCRINE NEGATIVE: 0
PSYCHIATRIC NEGATIVE: 0
ALLERGIC/IMMUNOLOGIC NEGATIVE: 0
MUSCULOSKELETAL NEGATIVE: 0
RESPIRATORY NEGATIVE: 0
CARDIOVASCULAR NEGATIVE: 0
CONSTITUTIONAL NEGATIVE: 0
EYES NEGATIVE: 1
NEUROLOGICAL NEGATIVE: 0
HEMATOLOGIC/LYMPHATIC NEGATIVE: 0

## 2025-05-06 ASSESSMENT — REFRACTION
OS_SPHERE: -1.75
OS_CYLINDER: -0.50
OD_ADD: +2.00
OD_SPHERE: -1.50
OD_AXIS: 015
OD_CYLINDER: -0.50
OS_AXIS: 170
OS_ADD: +2.00

## 2025-05-06 ASSESSMENT — REFRACTION_WEARINGRX
OS_SPHERE: -2.25
OS_ADD: +1.75
OD_SPHERE: -2.00
OD_ADD: +1.75

## 2025-05-06 ASSESSMENT — TONOMETRY
OD_IOP_MMHG: 16
OS_IOP_MMHG: 16
IOP_METHOD: GOLDMANN APPLANATION

## 2025-05-06 ASSESSMENT — PACHYMETRY
OD_CT(UM): 598
OS_CT(UM): 590

## 2025-05-06 ASSESSMENT — SLIT LAMP EXAM - LIDS
COMMENTS: GOOD POSITION
COMMENTS: GOOD POSITION

## 2025-05-06 ASSESSMENT — CUP TO DISC RATIO
OD_RATIO: .5
OS_RATIO: .5

## 2025-05-06 ASSESSMENT — VISUAL ACUITY
OS_CC: J1
OD_CC: J2
OS_CC: 20/20
CORRECTION_TYPE: GLASSES
METHOD: SNELLEN - LINEAR
OD_CC: 20/25

## 2025-05-06 ASSESSMENT — EXTERNAL EXAM - RIGHT EYE: OD_EXAM: NORMAL

## 2025-05-06 ASSESSMENT — EXTERNAL EXAM - LEFT EYE: OS_EXAM: NORMAL

## 2025-05-06 NOTE — PROGRESS NOTES
Assessment/Plan   Diagnoses and all orders for this visit:  Myopia of both eyes  Regular astigmatism of both eyes  Presbyopia  New spec rx released today per patient request. Ocular health wnl for age OU. Monitor 1 year or sooner prn. Refraction billed today. Pt consents to receiving glasses Rx today. Patient's/guardian's signature obtained to acknowledge and confirm that a paper copy of glasses Rx was given to patient in compliance with [facility] Eyeglass Rule. Electronic copy of Rx will also be available via byUs/EPIC.   Option for computer/work place.     Glaucoma suspect of both eyes  -     OCT, Optic Nerve - OU - Both Eyes  Stable to 2022. Discussed. No evidence of progression to glaucomatous optic neuropathy. Monitor in 1 year w/ OCT RNFL and optos photo.     Anterior basement membrane dystrophy of both eyes  Stable. Continue w/ emerald buck qhs and Ats bid OU.    Amblyopia of right eye  Stable. Continue w/ full time correction. Monitor.

## 2025-06-07 DIAGNOSIS — F41.9 ANXIETY AND DEPRESSION: ICD-10-CM

## 2025-06-07 DIAGNOSIS — F32.A ANXIETY AND DEPRESSION: ICD-10-CM

## 2025-06-09 RX ORDER — BUPROPION HYDROCHLORIDE 150 MG/1
150 TABLET ORAL EVERY MORNING
Qty: 90 TABLET | Refills: 1 | Status: SHIPPED | OUTPATIENT
Start: 2025-06-09 | End: 2025-12-06

## 2025-07-08 ENCOUNTER — APPOINTMENT (OUTPATIENT)
Dept: OPHTHALMOLOGY | Facility: CLINIC | Age: 50
End: 2025-07-08
Payer: COMMERCIAL

## 2025-07-08 DIAGNOSIS — H52.4 PRESBYOPIA: ICD-10-CM

## 2025-07-08 DIAGNOSIS — H52.13 MYOPIA OF BOTH EYES: Primary | ICD-10-CM

## 2025-07-08 DIAGNOSIS — H52.223 REGULAR ASTIGMATISM OF BOTH EYES: ICD-10-CM

## 2025-07-08 ASSESSMENT — ENCOUNTER SYMPTOMS
CARDIOVASCULAR NEGATIVE: 0
EYES NEGATIVE: 0
HEMATOLOGIC/LYMPHATIC NEGATIVE: 0
ALLERGIC/IMMUNOLOGIC NEGATIVE: 0
ENDOCRINE NEGATIVE: 0
NEUROLOGICAL NEGATIVE: 0
MUSCULOSKELETAL NEGATIVE: 0
PSYCHIATRIC NEGATIVE: 0
CONSTITUTIONAL NEGATIVE: 0
GASTROINTESTINAL NEGATIVE: 0
RESPIRATORY NEGATIVE: 0

## 2025-07-08 ASSESSMENT — REFRACTION_WEARINGRX
OD_AXIS: 015
OD_CYLINDER: -0.50
SPECS_TYPE: PAL
OS_ADD: +2.00
OS_CYLINDER: -0.50
OD_SPHERE: -0.25
OD_AXIS: 015
OD_SPHERE: -1.75
OS_AXIS: 170
OS_ADD: +0.75
OS_SPHERE: -2.00
OS_SPHERE: -0.50
OD_ADD: +0.75
OS_AXIS: 170
OD_CYLINDER: -0.50
OD_ADD: +2.00
OS_CYLINDER: -0.50

## 2025-07-08 ASSESSMENT — VISUAL ACUITY
METHOD: SNELLEN - LINEAR
OD_CC: 20/25
CORRECTION_TYPE: GLASSES
OS_CC: 20/20

## 2025-07-08 ASSESSMENT — PACHYMETRY
OD_CT(UM): 598
OS_CT(UM): 590

## 2025-07-08 ASSESSMENT — REFRACTION_MANIFEST
OD_AXIS: 015
OD_SPHERE: -1.75
OS_SPHERE: -2.00
OD_CYLINDER: -0.50
OS_ADD: +2.00
OS_CYLINDER: -0.50
OD_ADD: +2.00
OS_AXIS: 170

## 2025-07-08 NOTE — PROGRESS NOTES
Assessment/Plan   Diagnoses and all orders for this visit:  Myopia of both eyes  Regular astigmatism of both eyes  Presbyopia  Current add set to low to access in larger B dimension frame. Increase add power to offset larger size of frame in PAL.  Habitual B dimension 38mm/ new B dimensions 41mm and 44mm. Discussed how layout of PAL is affected by change in B dimension of frame. Adjusted add power accordingly.

## 2026-05-11 ENCOUNTER — APPOINTMENT (OUTPATIENT)
Dept: OPHTHALMOLOGY | Facility: CLINIC | Age: 51
End: 2026-05-11
Payer: COMMERCIAL